# Patient Record
Sex: FEMALE | Race: WHITE | NOT HISPANIC OR LATINO
[De-identification: names, ages, dates, MRNs, and addresses within clinical notes are randomized per-mention and may not be internally consistent; named-entity substitution may affect disease eponyms.]

---

## 2021-05-10 ENCOUNTER — APPOINTMENT (OUTPATIENT)
Dept: CARE COORDINATION | Facility: HOME HEALTH | Age: 81
End: 2021-05-10
Payer: MEDICARE

## 2021-05-10 VITALS
HEART RATE: 92 BPM | DIASTOLIC BLOOD PRESSURE: 80 MMHG | SYSTOLIC BLOOD PRESSURE: 104 MMHG | RESPIRATION RATE: 16 BRPM | OXYGEN SATURATION: 96 %

## 2021-05-10 PROBLEM — Z00.00 ENCOUNTER FOR PREVENTIVE HEALTH EXAMINATION: Status: ACTIVE | Noted: 2021-05-10

## 2021-05-10 PROCEDURE — 99342 HOME/RES VST NEW LOW MDM 30: CPT

## 2021-05-10 NOTE — CURRENT MEDS
[Side Effects] :  side effects [Yes] : Reviewed medication list for presence of high-risk medications. [Blood Thinners] : blood thinners [Takes medication as prescribed] : does not take

## 2021-05-10 NOTE — HISTORY OF PRESENT ILLNESS
[FreeTextEntry1] : Follow-up for discharge from [facility] for New a.fib and NSTEMI\par  [de-identified] : Patient is a 80 year y/o female enrolled in the [employer] program with a history of crohn's, Gout, HTN, bowel resection in her 20s, who was recently admitted from 5/6/21-5/7/21 to [facility] of new a.fib, and NSTEMI.  Patient presented to [facility] ED for evaluation of variable heart rate.  She recently injured her right lower extremity/anterior shin, was placed on cephalexin but took 8 pills of it, developed diarrhea so stopped taking it.  She took tylenol about 4 days ago, had a self-resolving episode of "variable heart rate."  She had another episode on 5/5/21 at 7:30 PM after taking tylenol, felt a fluttering sensation in the sternum accompanied with warmth/heat in her upper chest.  Due to persistence of her symptoms she called EMS.  Patient noted to aLupisfib with RVR, and elevated trop, dx with NSTEMI.  Cardiac Cath done, no stents needed.  Patient converted to NSR with cardizem.  Patient given eliquis for anticoagulation.  Echo noted.  Patient medically optimized and stable for discharge.  \par \par Patient visited in home and is alert and oriented x3, and in no acute distress.  Patient states she does not feel right and thinks she is have a reaction to the medications.  She did not take the eliquis and cardizem today because of how she is feeling.  Patient has a hard time describing how she feels, but states she feels like she is in a daze.  Patient also states having episodes yesterday of feeling flushed, no episodes today.  Patient noted to have low blood pressure today. Noted that patient was getting cardizem xr 120mg in hospital and was discharged on 240mg.   Spoke to Dr. Velázquez, and determined that the cardizem dose might be too much.  Patient to hold cardizem and has appointment tomorrow with Dr. Velázquez who will determine home regiment.   Patient instructed to drink extra fluids and rest today.  Patient also with diarrhea yesterday which has now resolved.  Patient denies shortness of breath, palpitations, chest pain, nausea, or vomiting.\par

## 2021-05-10 NOTE — REVIEW OF SYSTEMS
[Diarrhea] : diarrhea [Negative] : Integumentary [Fever] : no fever [Fatigue] : no fatigue [Hot Flashes] : no hot flashes [Chest Pain] : no chest pain [Palpitations] : no palpitations [Lower Ext Edema] : no lower extremity edema [Shortness Of Breath] : no shortness of breath [Wheezing] : no wheezing [Cough] : no cough [Dyspnea on Exertion] : no dyspnea on exertion [Abdominal Pain] : no abdominal pain [Nausea] : no nausea [Vomiting] : no vomiting [de-identified] : "I feel dazed"

## 2021-05-10 NOTE — PLAN
[FreeTextEntry1] : 1) NSTEMI\par - ECHO:\par   LM: Normal\par   LAD: Tortuous vessels, mild luminal irregularities\par   LCx: Mild luminal irregularities \par   RCA: Large, dominant vessel, mild luminal irregularities\par   LVEF: 55-60%, LVH\par   EDP normal\par   Aortic root is dilated\par - Educated on taking Eliquis every 12 hours, at the same time every day, and to not miss a does\par - Hold Cardizem and follow-up with Dr. Velázquez tomorrow as scheduled.\par - Continue on anticoagulation regimen as directed.\par - Patient verbalized understanding and was able to teach back medication management.\par - Report all chest pain or discomfort.\par - Educated on blood pressure monitoring.\par - Contact information given and advised to call with any questions or concerns.\par \par 2) A.fib\par - Patient in NSR at time of exam\par - Continue eliquis q12 hours, teaching as above\par - Follow-up with Dr. Velázquez, mat need holter monitor as outpatient.\par \par 3) Hypertension\par - Hold cardizem at this time as b/p is low.\par \par \par

## 2021-05-10 NOTE — ASSESSMENT
[FreeTextEntry1] : Patient is a 80 year y/o female enrolled in the [employer] program with a history of crohn's, Gout, HTN, bowel resection in her 20s, who was recently admitted from 5/6/21-5/7/21 to [facility] of new a.fib, and NSTEMI.  Patient presented to [facility] ED for evaluation of variable heart rate.  She recently injured her right lower extremity/anterior shin, was placed on cephalexin but took 8 pills of it, developed diarrhea so stopped taking it.  She took tylenol about 4 days ago, had a self-resolving episode of "variable heart rate."  She had another episode on 5/5/21 at 7:30 PM after taking tylenol, felt a fluttering sensation in the sternum accompanied with warmth/heat in her upper chest.  Due to persistence of her symptoms she called EMS.  Patient noted to aLuipsfib with RVR, and elevated trop, dx with NSTEMI.  Cardiac Cath done, no stents needed.  Patient converted to NSR with cardizem.  Patient given eliquis for anticoagulation.  Echo noted.  Patient medically optimized and stable for discharge.    Patient visited in home and is alert and oriented x3, and in no acute distress.  Patient states she does not feel right and thinks she is have a reaction to the medications.  She did not take the eliquis and cardizem today because of how she is feeling.  Patient has a hard time describing how she feels, but states she feels like she is in a daze.  Patient also states having episodes yesterday of feeling flushed, no episodes today.  Patient noted to have low blood pressure today. Noted that patient was getting cardizem xr 120mg in hospital and was discharged on 240mg.   Spoke to Dr. Velázquez, and determined that the cardizem dose might be too much.  Patient to hold cardizem and has appointment tomorrow with Dr. Velázquez who will determine home regiment.   Patient instructed to drink extra fluids and rest today.  Patient also with diarrhea yesterday which has now resolved.  Patient denies shortness of breath, palpitations, chest pain, nausea, or vomiting.

## 2021-05-10 NOTE — DATA REVIEWED
[FreeTextEntry1] : ECHO IMPRESSION:\par \par   Aorta:             The aortic root is normal in size. The ascending aorta is\par mildly dilated.\par   \par             Aortic valve: Mildly calcified trileaflet aortic valve. The peak\par aortic valve gradient is 14 mmHg. The mean aortic valve gradient is 9 mmHg. No\par significant Aortic stenosis. Mild Aortic regurgitation.                 \par   \par             Left Atrium:    Normal  \par   \par             Mitral Valve:  Normal appearing mitral valve. No mitral stenosis.\par Mild Mitral regurgitation. Systolic anterior motion of the mitral valve cord.\par No LVEF outflow tract gradient identified at rest. The patient was unable to\par perform Valsalva.       \par   \par             Left Ventricle: Normal LV size. Basal septal hypertrophy, 1.9 cm.\par Normal LV systolic function without regional wall motion abnormalities. The\par estimated ejection fraction is 55 percent. LV diastolic function: Grade 1 LV\par diastolic dysfunction\par   \par             Right Ventricle: Normal size and function.\par   \par             Right Atrium:     Normal  \par   \par            Tricuspid Valve: Normal appearing tricuspid valve. Mild Tricuspid\par regurgitation. The estimated pulmonary artery systolic pressure is 24  mmHg. \par No pulmonary hypertension\par              \par             \par             Pulmonic Valve: Structurally normal. Trace pulmonic regurgitation \par              \par             Pericardium: No pericardial effusion. No echo evidence of cardiac\par tamponade \par              \par             IVC: The IVC is normal in size with >50% collapse with\par inspiration. This correlates with a normal RA pressure.\par              \par            Thrombus/Mass: No evidence of thrombus,mass or vegetation.\par              \par            Shunt:  No evidence of an intracardiac shunt.

## 2021-05-10 NOTE — PHYSICAL EXAM
[No Acute Distress] : no acute distress [Well Nourished] : well nourished [Well Developed] : well developed [Well-Appearing] : well-appearing [Normal Sclera/Conjunctiva] : normal sclera/conjunctiva [PERRL] : pupils equal round and reactive to light [Normal Outer Ear/Nose] : the outer ears and nose were normal in appearance [No JVD] : no jugular venous distention [No Respiratory Distress] : no respiratory distress  [No Accessory Muscle Use] : no accessory muscle use [Clear to Auscultation] : lungs were clear to auscultation bilaterally [Normal Rate] : normal rate  [Regular Rhythm] : with a regular rhythm [Normal S1, S2] : normal S1 and S2 [No Edema] : there was no peripheral edema [Soft] : abdomen soft [Non Tender] : non-tender [Non-distended] : non-distended [Normal Bowel Sounds] : normal bowel sounds [No Rash] : no rash [Coordination Grossly Intact] : coordination grossly intact [No Focal Deficits] : no focal deficits [Normal Gait] : normal gait [Normal Affect] : the affect was normal [Alert and Oriented x3] : oriented to person, place, and time [Normal Insight/Judgement] : insight and judgment were intact [de-identified] : Murmur noted

## 2021-05-11 ENCOUNTER — APPOINTMENT (OUTPATIENT)
Dept: HEART AND VASCULAR | Facility: CLINIC | Age: 81
End: 2021-05-11

## 2021-05-14 ENCOUNTER — APPOINTMENT (OUTPATIENT)
Dept: CARE COORDINATION | Facility: HOME HEALTH | Age: 81
End: 2021-05-14
Payer: MEDICARE

## 2021-05-14 VITALS
HEART RATE: 80 BPM | RESPIRATION RATE: 16 BRPM | DIASTOLIC BLOOD PRESSURE: 80 MMHG | OXYGEN SATURATION: 99 % | SYSTOLIC BLOOD PRESSURE: 120 MMHG

## 2021-05-14 DIAGNOSIS — I25.2 OLD MYOCARDIAL INFARCTION: ICD-10-CM

## 2021-05-14 DIAGNOSIS — R19.5 OTHER FECAL ABNORMALITIES: ICD-10-CM

## 2021-05-14 DIAGNOSIS — I10 ESSENTIAL (PRIMARY) HYPERTENSION: ICD-10-CM

## 2021-05-14 PROCEDURE — 99349 HOME/RES VST EST MOD MDM 40: CPT

## 2021-05-18 ENCOUNTER — NON-APPOINTMENT (OUTPATIENT)
Age: 81
End: 2021-05-18

## 2021-05-18 ENCOUNTER — APPOINTMENT (OUTPATIENT)
Dept: HEART AND VASCULAR | Facility: CLINIC | Age: 81
End: 2021-05-18
Payer: MEDICARE

## 2021-05-18 VITALS
DIASTOLIC BLOOD PRESSURE: 70 MMHG | WEIGHT: 108 LBS | TEMPERATURE: 98.5 F | SYSTOLIC BLOOD PRESSURE: 130 MMHG | OXYGEN SATURATION: 98 % | HEART RATE: 73 BPM

## 2021-05-18 PROCEDURE — 93228 REMOTE 30 DAY ECG REV/REPORT: CPT

## 2021-05-18 PROCEDURE — 36415 COLL VENOUS BLD VENIPUNCTURE: CPT

## 2021-05-18 PROCEDURE — 99214 OFFICE O/P EST MOD 30 MIN: CPT

## 2021-05-18 PROCEDURE — 99204 OFFICE O/P NEW MOD 45 MIN: CPT

## 2021-05-18 RX ORDER — DILTIAZEM HYDROCHLORIDE 240 MG/1
240 CAPSULE, COATED, EXTENDED RELEASE ORAL
Refills: 0 | Status: DISCONTINUED | COMMUNITY
End: 2021-05-18

## 2021-05-18 NOTE — CARDIOLOGY SUMMARY
[de-identified] : 5/11/21\par Normal LV size. Basal septal hypertrophy, 2.0 cm.\par Apical, apical septal, anteroseptal hypokinesis The estimated ejection\par fraction is 45 percent. LV diastolic function: Grade 1 LV diastolic\par dysfunction

## 2021-05-18 NOTE — DISCUSSION/SUMMARY
[___ Week(s)] : in [unfilled] week(s) [FreeTextEntry1] : 80 y/o F with recurrent Takotsubo CM, Afib w/ RVR in setting hypokalemia, CCB toxicity here for follow up\par \par # Stress CM\par - Continue low dose BB (patient not tolerant to medication)\par - Will consider starting low dose ACE\par - Repeat Echo in 2-3 weeks\par \par # AFib w/ RVR\par - Check BMP today\par - Event monitor for 30 days\par - Contiue Eliquis\par - Follow up with EP\par \par # Enlarged Aorta\par - Repeat CT in 6 months

## 2021-05-18 NOTE — PHYSICAL EXAM
[Well Developed] : well developed [Well Nourished] : well nourished [No Acute Distress] : no acute distress [Normal Conjunctiva] : normal conjunctiva [Normal Venous Pressure] : normal venous pressure [No Carotid Bruit] : no carotid bruit [Normal S1, S2] : normal S1, S2 [No Gallop] : no gallop [Murmur] : murmur [Clear Lung Fields] : clear lung fields [Good Air Entry] : good air entry [No Respiratory Distress] : no respiratory distress  [Soft] : abdomen soft [Non Tender] : non-tender [No Masses/organomegaly] : no masses/organomegaly [Normal Bowel Sounds] : normal bowel sounds [Normal Gait] : normal gait [No Edema] : no edema [No Cyanosis] : no cyanosis [No Clubbing] : no clubbing [No Varicosities] : no varicosities [No Rash] : no rash [No Skin Lesions] : no skin lesions [Moves all extremities] : moves all extremities [No Focal Deficits] : no focal deficits [Normal Speech] : normal speech [Alert and Oriented] : alert and oriented [Normal memory] : normal memory [de-identified] : systolic murmur, likely LVOT

## 2021-05-18 NOTE — HISTORY OF PRESENT ILLNESS
[FreeTextEntry1] : 79 y/o F with HTN, Prior Hx of Stress CM, recent admission at [facility] with new onset tachyarrhythmia and elevated troponin, Cardiac cath was negative for Coronary stenosis and she had a normal EF.\par \par Patient was discharged on CCB which was uptitrated on discharge, readmitted with hypotension, bradycardia and new EKG changes with prolonged QTc\par Repeat Echo shows anterior WMA with + LVOT gradient. Meds were discontinued

## 2021-05-19 PROBLEM — R19.5 LOOSE STOOLS: Status: ACTIVE | Noted: 2021-05-19

## 2021-05-19 LAB
ANION GAP SERPL CALC-SCNC: 14 MMOL/L
BUN SERPL-MCNC: 18 MG/DL
CALCIUM SERPL-MCNC: 9.9 MG/DL
CHLORIDE SERPL-SCNC: 104 MMOL/L
CO2 SERPL-SCNC: 24 MMOL/L
CREAT SERPL-MCNC: 1.42 MG/DL
GLUCOSE SERPL-MCNC: 76 MG/DL
MAGNESIUM SERPL-MCNC: 1.7 MG/DL
POTASSIUM SERPL-SCNC: 4.8 MMOL/L
SODIUM SERPL-SCNC: 142 MMOL/L

## 2021-05-19 RX ORDER — MESALAMINE 400 MG/1
400 CAPSULE, DELAYED RELEASE ORAL
Refills: 0 | Status: COMPLETED | COMMUNITY
End: 2021-05-19

## 2021-05-19 RX ORDER — CYCLOBENZAPRINE HCL 5 MG
5 TABLET ORAL
Refills: 0 | Status: COMPLETED | COMMUNITY
End: 2021-05-19

## 2021-05-19 NOTE — REVIEW OF SYSTEMS
[Fever] : no fever [Chills] : no chills [Fatigue] : fatigue [Chest Pain] : no chest pain [Palpitations] : no palpitations [Lower Ext Edema] : no lower extremity edema [Shortness Of Breath] : no shortness of breath [Cough] : no cough [Dyspnea on Exertion] : no dyspnea on exertion [Abdominal Pain] : no abdominal pain [Nausea] : no nausea [Constipation] : no constipation [Vomiting] : no vomiting [Negative] : Neurological [FreeTextEntry7] : Loose stool

## 2021-05-19 NOTE — COUNSELING
[de-identified] : Patient counseled on foods to eat to help with loose stools, including bread, bananas, yogurt, rice, applesauce.

## 2021-05-19 NOTE — PLAN
[FreeTextEntry1] : 1) Cardiomyopathy\par - Cardizem stopped and metoprolol 12.5mg started\par - Continue apixaban\par - Patient verbalized understanding and was able to teach back medication management.\par - Report all chest pain or discomfort.\par - Contact information given and advised to call with any questions or concerns.\par \par 2) A.fib\par - Patient in NSR at time of exam\par - Continue eliquis and metoprolol\par \par 3) HTN\par - Continue metoprolol\par \par 4) Loose stools\par - Probably related to recent antibiotic use\par - Advised to eat bananas, bread, rice, etc\par - Continue to monitor for worsening diarrhea.\par \par 5) Gout\par - Continue Febuxostat\par \par Follow-up with Dr. Velázquez on Tuesday as scheduled.\par

## 2021-05-19 NOTE — ASSESSMENT
[FreeTextEntry1] : Patient is a 81 year y/o female enrolled in the [employer] program with a history of crohns, gout, newly diagnosed a.fib, NSTEMI s/p noraml cath.  Patient recently readmitted from 5/10/21-5/13/21 to [facility] for chest discomfort.  Patient with EKG changes and increasing trop.  Patient diagnosed with Takotsubo cardiomyopathy.  Cardizem stopped and started on metoprolol.  Patient medically optimized and stable for discharge.\par \par Patient evaluated in her home and is alert and oriented x3, and in no acute distress.  Patient complains that she is having loose stools, about 1 per day, and feel sleepy.  Patient states the loose stool started when she was on abx a few weeks ago, has improved slightly but is still loose.  Patient educated on foods to help, such as bananas, bread, rice, etc.  Patient states she is very nervous that something else is going to happen, but reassured her that we will follow her closely with her doctors. Patient denies chest pain, palpitations, shortness of breath, abdominal pain, nausea, vomiting, diarrhea, lightheaded or dizziness.

## 2021-05-19 NOTE — PHYSICAL EXAM
[No Acute Distress] : no acute distress [Well Nourished] : well nourished [Well Developed] : well developed [Well-Appearing] : well-appearing [Normal Sclera/Conjunctiva] : normal sclera/conjunctiva [PERRL] : pupils equal round and reactive to light [Normal Outer Ear/Nose] : the outer ears and nose were normal in appearance [No JVD] : no jugular venous distention [No Respiratory Distress] : no respiratory distress  [No Accessory Muscle Use] : no accessory muscle use [Clear to Auscultation] : lungs were clear to auscultation bilaterally [Normal Rate] : normal rate  [Regular Rhythm] : with a regular rhythm [Normal S1, S2] : normal S1 and S2 [No Edema] : there was no peripheral edema [Soft] : abdomen soft [Non Tender] : non-tender [Non-distended] : non-distended [Normal Bowel Sounds] : normal bowel sounds [No CVA Tenderness] : no CVA  tenderness [No Rash] : no rash [No Focal Deficits] : no focal deficits [Normal Gait] : normal gait [Normal Affect] : the affect was normal [Alert and Oriented x3] : oriented to person, place, and time [Normal Mood] : the mood was normal [Normal Insight/Judgement] : insight and judgment were intact

## 2021-06-01 ENCOUNTER — APPOINTMENT (OUTPATIENT)
Dept: HEART AND VASCULAR | Facility: CLINIC | Age: 81
End: 2021-06-01
Payer: MEDICARE

## 2021-06-01 VITALS
HEIGHT: 57 IN | DIASTOLIC BLOOD PRESSURE: 60 MMHG | BODY MASS INDEX: 23.3 KG/M2 | SYSTOLIC BLOOD PRESSURE: 110 MMHG | HEART RATE: 67 BPM | WEIGHT: 108 LBS | OXYGEN SATURATION: 98 %

## 2021-06-01 PROCEDURE — 99214 OFFICE O/P EST MOD 30 MIN: CPT

## 2021-06-01 PROCEDURE — 36415 COLL VENOUS BLD VENIPUNCTURE: CPT

## 2021-06-02 LAB
ANION GAP SERPL CALC-SCNC: 14 MMOL/L
BASOPHILS # BLD AUTO: 0.06 K/UL
BASOPHILS NFR BLD AUTO: 1.1 %
BUN SERPL-MCNC: 22 MG/DL
CALCIUM SERPL-MCNC: 10.3 MG/DL
CHLORIDE SERPL-SCNC: 109 MMOL/L
CO2 SERPL-SCNC: 21 MMOL/L
CREAT SERPL-MCNC: 1.53 MG/DL
EOSINOPHIL # BLD AUTO: 0.17 K/UL
EOSINOPHIL NFR BLD AUTO: 3.2 %
GLUCOSE SERPL-MCNC: 87 MG/DL
HCT VFR BLD CALC: 42.6 %
HGB BLD-MCNC: 13 G/DL
IMM GRANULOCYTES NFR BLD AUTO: 0.6 %
LYMPHOCYTES # BLD AUTO: 1.5 K/UL
LYMPHOCYTES NFR BLD AUTO: 28.6 %
MAGNESIUM SERPL-MCNC: 1.7 MG/DL
MAN DIFF?: NORMAL
MCHC RBC-ENTMCNC: 30.5 GM/DL
MCHC RBC-ENTMCNC: 31.4 PG
MCV RBC AUTO: 102.9 FL
MONOCYTES # BLD AUTO: 0.37 K/UL
MONOCYTES NFR BLD AUTO: 7 %
NEUTROPHILS # BLD AUTO: 3.12 K/UL
NEUTROPHILS NFR BLD AUTO: 59.5 %
PLATELET # BLD AUTO: 247 K/UL
POTASSIUM SERPL-SCNC: 4.3 MMOL/L
RBC # BLD: 4.14 M/UL
RBC # FLD: 14 %
SODIUM SERPL-SCNC: 144 MMOL/L
WBC # FLD AUTO: 5.25 K/UL

## 2021-06-10 ENCOUNTER — NON-APPOINTMENT (OUTPATIENT)
Age: 81
End: 2021-06-10

## 2021-06-15 ENCOUNTER — NON-APPOINTMENT (OUTPATIENT)
Age: 81
End: 2021-06-15

## 2021-06-15 ENCOUNTER — APPOINTMENT (OUTPATIENT)
Dept: HEART AND VASCULAR | Facility: CLINIC | Age: 81
End: 2021-06-15
Payer: MEDICARE

## 2021-06-15 ENCOUNTER — APPOINTMENT (OUTPATIENT)
Dept: HEART AND VASCULAR | Facility: CLINIC | Age: 81
End: 2021-06-15

## 2021-06-15 VITALS
HEART RATE: 48 BPM | BODY MASS INDEX: 23.3 KG/M2 | TEMPERATURE: 98 F | SYSTOLIC BLOOD PRESSURE: 135 MMHG | DIASTOLIC BLOOD PRESSURE: 70 MMHG | OXYGEN SATURATION: 98 % | WEIGHT: 108 LBS | HEIGHT: 57 IN

## 2021-06-15 PROCEDURE — 93000 ELECTROCARDIOGRAM COMPLETE: CPT

## 2021-06-15 PROCEDURE — 99213 OFFICE O/P EST LOW 20 MIN: CPT

## 2021-06-16 NOTE — HISTORY OF PRESENT ILLNESS
[FreeTextEntry1] : 80 y/o F with HTN, Prior Hx of Stress CM, recent admission at [facility] with new onset tachyarrhythmia and elevated troponin, Cardiac cath was negative for Coronary stenosis and she had a normal EF.\par \par Patient was discharged on CCB which was uptitrated on discharge, readmitted with hypotension, bradycardia and new EKG changes with prolonged QTc\par Repeat Echo shows anterior WMA with + LVOT gradient. Meds were discontinued\par \par Patient here for follow up, has been feeling overall weak but no chest pain, SOB, syncope / presyncope. \par Her [employer] monitor did not show evidence of Afib and Eliquis was stopped\par Feels her heart beats slowly when she checks her pulse

## 2021-06-16 NOTE — REVIEW OF SYSTEMS
[Feeling Fatigued] : feeling fatigued [SOB] : no shortness of breath [Dyspnea on exertion] : not dyspnea during exertion [Chest Discomfort] : no chest discomfort [Lower Ext Edema] : no extremity edema [Leg Claudication] : no intermittent leg claudication [Palpitations] : no palpitations [Orthopnea] : no orthopnea [PND] : no PND [Syncope] : no syncope [Negative] : Heme/Lymph

## 2021-06-16 NOTE — DISCUSSION/SUMMARY
[___ Week(s)] : in [unfilled] week(s) [FreeTextEntry1] : 80 y/o F with recurrent Takotsubo CM, Afib w/ RVR in setting hypokalemia, CCB toxicity here for follow up\par \par # Stress CM\par - Hold BB now, she's having significant bradycardia\par - Will consider starting low dose ACE however worried about patients tolerance to any medication, she is very sensitive to any medication started\par - Repeat Echo next visit\par \par # AFib w/ RVR\par - Event monitor does not show any further episodes of Afib\par - On eliquis - update patient called regarding reaction she believes is related to eliquis and was discontinued due to absence of Afib on event monitor, will hold for now\par - Follow up with EP, some sinus pauses noted on monitor and sinus bradycardia today\par \par # Enlarged Aorta\par Repeat CT

## 2021-06-16 NOTE — PHYSICAL EXAM
[Well Developed] : well developed [Well Nourished] : well nourished [No Acute Distress] : no acute distress [Normal Conjunctiva] : normal conjunctiva [Normal Venous Pressure] : normal venous pressure [No Carotid Bruit] : no carotid bruit [Normal S1, S2] : normal S1, S2 [No Gallop] : no gallop [Murmur] : murmur [Clear Lung Fields] : clear lung fields [Good Air Entry] : good air entry [No Respiratory Distress] : no respiratory distress  [Soft] : abdomen soft [Non Tender] : non-tender [No Masses/organomegaly] : no masses/organomegaly [Normal Bowel Sounds] : normal bowel sounds [Normal Gait] : normal gait [No Edema] : no edema [No Cyanosis] : no cyanosis [No Clubbing] : no clubbing [No Varicosities] : no varicosities [No Rash] : no rash [No Skin Lesions] : no skin lesions [Moves all extremities] : moves all extremities [No Focal Deficits] : no focal deficits [Normal Speech] : normal speech [Alert and Oriented] : alert and oriented [Normal memory] : normal memory [de-identified] : systolic murmur, likely LVOT

## 2021-06-29 ENCOUNTER — APPOINTMENT (OUTPATIENT)
Dept: HEART AND VASCULAR | Facility: CLINIC | Age: 81
End: 2021-06-29
Payer: MEDICARE

## 2021-06-29 ENCOUNTER — NON-APPOINTMENT (OUTPATIENT)
Age: 81
End: 2021-06-29

## 2021-06-29 VITALS
BODY MASS INDEX: 22.87 KG/M2 | TEMPERATURE: 98 F | OXYGEN SATURATION: 98 % | SYSTOLIC BLOOD PRESSURE: 140 MMHG | WEIGHT: 106 LBS | DIASTOLIC BLOOD PRESSURE: 80 MMHG | HEART RATE: 84 BPM | HEIGHT: 57 IN

## 2021-06-29 PROCEDURE — 93000 ELECTROCARDIOGRAM COMPLETE: CPT

## 2021-06-29 PROCEDURE — 99215 OFFICE O/P EST HI 40 MIN: CPT

## 2021-06-29 RX ORDER — APIXABAN 2.5 MG/1
2.5 TABLET, FILM COATED ORAL
Refills: 0 | Status: DISCONTINUED | COMMUNITY
End: 2021-06-29

## 2021-07-01 ENCOUNTER — NON-APPOINTMENT (OUTPATIENT)
Age: 81
End: 2021-07-01

## 2021-07-01 NOTE — HISTORY OF PRESENT ILLNESS
[FreeTextEntry1] : 80 y/o F with HTN, Prior Hx of Stress CM, recent admission at [facility] with new onset tachyarrhythmia and elevated troponin, Cardiac cath was negative for Coronary stenosis and she had a normal EF.\par \par Patient was discharged on CCB which was uptitrated on discharge, readmitted with hypotension, bradycardia and new EKG changes with prolonged QTc\par Repeat Echo showed anterior WMA with + LVOT gradient. Meds were discontinued\par \par On outpatient follow up patient was feeling tired, HR was slow on BB which was discontinued\par \par Patient here for follow up today, reports feeling better, denies chest pain, SOB. Energy level improved somewhat. Has occasional dizziness, denies syncope\par \par EKG 6/15/21: Sinus bradycardia HR 40, BB held\par EKG 6/29/21: Sinus rhythm, HR 84

## 2021-07-01 NOTE — DISCUSSION/SUMMARY
[___ Month(s)] : in [unfilled] month(s) [FreeTextEntry1] : 82 y/o F with recurrent Takotsubo CM, transiert Afib w/ RVR in setting hypokalemia, CCB toxicity here for follow up\par \par # Stress CM\par - Holding BB due to significant bradycardia / sinus pauses, EP consult\par - Will consider starting low dose ACE however worried about patients tolerance to any medication, she is very sensitive to any medication started\par - Echocardiogram ordered for in office today - (EF appears normal and echo showing recovery from her Stress induced CM)\par \par # AFib w/ RVR\par - Event monitor does not show any further episodes of Afib\par - Was on eliquis - patient called regarding reaction she believes is related to eliquis and was discontinued due to absence of Afib on event monitor, will hold for now\par - Follow up with EP, some sinus pauses noted on monitor\par \par # HTN\par Her PCP has started her on Amlodipine 2.5mg daily\par BPs at goal today\par \par # Enlarged Aorta\par Repeat CT in 6 months - 1 year

## 2021-07-01 NOTE — REVIEW OF SYSTEMS
[SOB] : no shortness of breath [Dyspnea on exertion] : not dyspnea during exertion [Chest Discomfort] : no chest discomfort [Lower Ext Edema] : no extremity edema [Leg Claudication] : no intermittent leg claudication [Palpitations] : no palpitations [Orthopnea] : no orthopnea [PND] : no PND [Syncope] : no syncope [Dizziness] : dizziness [Negative] : Heme/Lymph

## 2021-07-06 ENCOUNTER — APPOINTMENT (OUTPATIENT)
Dept: HEART AND VASCULAR | Facility: CLINIC | Age: 81
End: 2021-07-06

## 2021-07-21 NOTE — HISTORY OF PRESENT ILLNESS
[FreeTextEntry1] : 80 y/o F with HTN, Prior Hx of Stress CM, recent admission at [facility] with new onset tachyarrhythmia and elevated troponin, Cardiac cath was negative for Coronary stenosis and she had a normal EF.\par \par Patient was discharged on CCB which was uptitrated on discharge, readmitted with hypotension, bradycardia and new EKG changes with prolonged QTc\par Repeat Echo shows anterior WMA with + LVOT gradient. Meds were discontinued\par \par Patient here for follow up, states she has been doing well overall, denies any palpitations, lightheadedness, syncope, has been wearing heart monitor.

## 2021-07-21 NOTE — PHYSICAL EXAM
[Well Developed] : well developed [Well Nourished] : well nourished [No Acute Distress] : no acute distress [Normal Conjunctiva] : normal conjunctiva [Normal Venous Pressure] : normal venous pressure [No Carotid Bruit] : no carotid bruit [Normal S1, S2] : normal S1, S2 [No Gallop] : no gallop [Murmur] : murmur [Clear Lung Fields] : clear lung fields [Good Air Entry] : good air entry [No Respiratory Distress] : no respiratory distress  [Soft] : abdomen soft [Non Tender] : non-tender [No Masses/organomegaly] : no masses/organomegaly [Normal Bowel Sounds] : normal bowel sounds [Normal Gait] : normal gait [No Edema] : no edema [No Cyanosis] : no cyanosis [No Clubbing] : no clubbing [No Varicosities] : no varicosities [No Rash] : no rash [No Skin Lesions] : no skin lesions [Moves all extremities] : moves all extremities [No Focal Deficits] : no focal deficits [Normal Speech] : normal speech [Alert and Oriented] : alert and oriented [Normal memory] : normal memory [de-identified] : systolic murmur, likely LVOT

## 2021-07-21 NOTE — DISCUSSION/SUMMARY
[___ Week(s)] : in [unfilled] week(s) [FreeTextEntry1] : 80 y/o F with recurrent Takotsubo CM, Afib w/ RVR in setting hypokalemia, CCB toxicity here for follow up\par \par # Stress CM\par - Continue low dose BB (patient not tolerant to medication)\par - Will consider starting low dose ACE\par - Repeat Echo next visit\par \par # AFib w/ RVR\par - Event monitor does not show any further episodes of Afib\par - On eliquis - update patient called regarding reaction she believes is related to eliquis and was discontinued due to absence of Afib on event monitor, will hold for now\par - Follow up with EP\par - Will check labs in office to makes sure K and other labs wnl post hospital - blood was collected in office\par \par # Enlarged Aorta\par - Repeat CT in 5-6 months. \par

## 2021-07-27 ENCOUNTER — APPOINTMENT (OUTPATIENT)
Dept: HEART AND VASCULAR | Facility: CLINIC | Age: 81
End: 2021-07-27
Payer: MEDICARE

## 2021-07-27 ENCOUNTER — NON-APPOINTMENT (OUTPATIENT)
Age: 81
End: 2021-07-27

## 2021-07-27 VITALS
HEART RATE: 87 BPM | DIASTOLIC BLOOD PRESSURE: 82 MMHG | BODY MASS INDEX: 63.21 KG/M2 | SYSTOLIC BLOOD PRESSURE: 124 MMHG | RESPIRATION RATE: 14 BRPM | HEIGHT: 57 IN | OXYGEN SATURATION: 96 % | WEIGHT: 293 LBS

## 2021-07-27 PROCEDURE — 99213 OFFICE O/P EST LOW 20 MIN: CPT

## 2021-07-27 PROCEDURE — 93000 ELECTROCARDIOGRAM COMPLETE: CPT

## 2021-07-29 ENCOUNTER — NON-APPOINTMENT (OUTPATIENT)
Age: 81
End: 2021-07-29

## 2021-07-29 NOTE — HISTORY OF PRESENT ILLNESS
[FreeTextEntry1] : 82 y/o F with HTN, Prior Hx of Stress CM, recent admission at [facility] with new onset tachyarrhythmia and elevated troponin, Cardiac cath was negative for Coronary stenosis and she had a normal EF.\par \par Patient was discharged on CCB which was uptitrated on discharge, readmitted with hypotension, bradycardia and new EKG changes with prolonged QTc\par Repeat Echo showed anterior WMA with + LVOT gradient. Meds were discontinued\par \par On outpatient follow up patient was feeling tired, HR was slow on BB which was discontinued\par \par Patient here for follow up today, reports feeling well, denies chest pain, SOB. Energy level improved somewhat. Has some MEYER. Has occasional dizziness, denies syncope\par \par EKG 6/15/21: Sinus bradycardia HR 40, BB held\par EKG 6/29/21: Sinus rhythm, HR 84\par Echo: 6/30/21: EF 65%, basal septal hypertrophy with chordal PREETHI, no WMA mild MR, mild TR, PASP 28mmHg, Aorta 3.9cm\par EKG 7/27/2021: NSR, HR 81

## 2021-07-29 NOTE — REVIEW OF SYSTEMS
[Dyspnea on exertion] : dyspnea during exertion [Negative] : Heme/Lymph [SOB] : no shortness of breath [Chest Discomfort] : no chest discomfort [Lower Ext Edema] : no extremity edema [Leg Claudication] : no intermittent leg claudication [Palpitations] : no palpitations [Orthopnea] : no orthopnea [PND] : no PND [Syncope] : no syncope

## 2021-07-29 NOTE — DISCUSSION/SUMMARY
[With ___] : with [unfilled] [FreeTextEntry1] : 82 y/o F with recurrent Takotsubo CM, transient Afib w/ RVR in setting hypokalemia, CCB toxicity here for follow up\par \par # Stress CM - resolved\par - Echocardiogram shows complete recovery of LVEF from Takotsubo CM\par - Holding BB due to significant bradycardia / sinus pauses, EP consulted\par - Will consider starting low dose ACE however worried about patients tolerance to any medication, she is very sensitive to any medication started\par \par # AFib w/ RVR\par - Event monitor does not show any further episodes of Afib\par - Was on eliquis - patient called regarding reaction she believes is related to eliquis and was discontinued due to absence of Afib on event monitor, will hold for now\par - Follow up with EP, some sinus pauses noted on monitor\par \par # HTN\par Her PCP has started her on Amlodipine 2.5mg daily\par BPs at goal today\par \par # Enlarged Aorta\par Repeat CT in 5 months - 1 year.

## 2021-08-03 ENCOUNTER — APPOINTMENT (OUTPATIENT)
Dept: HEART AND VASCULAR | Facility: CLINIC | Age: 81
End: 2021-08-03
Payer: MEDICARE

## 2021-08-03 VITALS
WEIGHT: 108 LBS | SYSTOLIC BLOOD PRESSURE: 135 MMHG | OXYGEN SATURATION: 95 % | HEART RATE: 77 BPM | HEIGHT: 57 IN | BODY MASS INDEX: 23.3 KG/M2 | DIASTOLIC BLOOD PRESSURE: 80 MMHG

## 2021-08-03 PROCEDURE — 99213 OFFICE O/P EST LOW 20 MIN: CPT

## 2021-10-05 ENCOUNTER — APPOINTMENT (OUTPATIENT)
Dept: HEART AND VASCULAR | Facility: CLINIC | Age: 81
End: 2021-10-05
Payer: MEDICARE

## 2021-10-05 ENCOUNTER — NON-APPOINTMENT (OUTPATIENT)
Age: 81
End: 2021-10-05

## 2021-10-05 VITALS
BODY MASS INDEX: 24.99 KG/M2 | SYSTOLIC BLOOD PRESSURE: 115 MMHG | DIASTOLIC BLOOD PRESSURE: 90 MMHG | HEART RATE: 91 BPM | WEIGHT: 108 LBS | OXYGEN SATURATION: 98 % | HEIGHT: 55 IN

## 2021-10-05 PROCEDURE — 99213 OFFICE O/P EST LOW 20 MIN: CPT

## 2021-10-06 NOTE — PHYSICAL EXAM
[Well Developed] : well developed [Well Nourished] : well nourished [Normal S1, S2] : normal S1, S2 [No Murmur] : no murmur [No Gallop] : no gallop [Clear Lung Fields] : clear lung fields [No Respiratory Distress] : no respiratory distress  [Soft] : abdomen soft [Non Tender] : non-tender [Normal Gait] : normal gait [No Edema] : no edema

## 2021-10-06 NOTE — REVIEW OF SYSTEMS
[Fever] : no fever [Chills] : no chills [SOB] : no shortness of breath [Dyspnea on exertion] : not dyspnea during exertion [Syncope] : no syncope [Cough] : no cough [Abdominal Pain] : no abdominal pain [Nausea] : no nausea [Vomiting] : no vomiting
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
## 2021-10-17 NOTE — REASON FOR VISIT
[Arrhythmia/ECG Abnorrmalities] : arrhythmia/ECG abnormalities [FreeTextEntry3] : Dr. Ralf Velázquez

## 2021-10-17 NOTE — HISTORY OF PRESENT ILLNESS
[FreeTextEntry1] : 80 yo female with hypertension, admission for stress cardiomyopathy with brief AF during that setting. Cardiac cath was negative for Coronary stenosis and she had a normal EF. She was discharged with higher dose of calcium channel blocker, readmitted with hypotension, bradycardia and new EKG changes with prolonged QTc\par Repeat Echo showed anterior WMA with + LVOT gradient. Meds were discontinued. She was switched to beta-blocker. This was discontinued due to fatigue. She has not had any more AF noted since that time.\par Event monitor in July 2021 showed sinus rhythm with brief episodes of atrial arrhythmia, with some sinus pauses up to 3 seconds. She has no complaints. She denies palpitations, dizziness, or syncope.

## 2021-10-19 ENCOUNTER — APPOINTMENT (OUTPATIENT)
Dept: HEART AND VASCULAR | Facility: CLINIC | Age: 81
End: 2021-10-19
Payer: MEDICARE

## 2021-10-19 VITALS
WEIGHT: 108 LBS | SYSTOLIC BLOOD PRESSURE: 170 MMHG | OXYGEN SATURATION: 95 % | HEIGHT: 55 IN | HEART RATE: 75 BPM | BODY MASS INDEX: 24.99 KG/M2 | DIASTOLIC BLOOD PRESSURE: 98 MMHG

## 2021-10-19 PROCEDURE — 99212 OFFICE O/P EST SF 10 MIN: CPT
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
## 2021-10-20 NOTE — HISTORY OF PRESENT ILLNESS
[FreeTextEntry1] : 82 y/o F with HTN, Prior Hx of Stress CM, recent admission at [facility] with new onset tachyarrhythmia and elevated troponin, Cardiac cath was negative for Coronary stenosis and she had a normal EF.\par \par Patient was discharged on CCB which was uptitrated on discharge, readmitted with hypotension, bradycardia and new EKG changes with prolonged QTc\par Repeat Echo showed anterior WMA with + LVOT gradient. Meds were discontinued\par \par On outpatient follow up patient was feeling tired, HR was slow on BB which was discontinued\par \par Patient here for follow up today, reports feeling well, denies chest pain, SOB. Energy level improved somewhat. Has some MEYER. Has occasional dizziness, denies syncope\par \par EKG 6/15/21: Sinus bradycardia HR 40, BB held\par EKG 6/29/21: Sinus rhythm, HR 84\par Echo: 6/30/21: EF 65%, basal septal hypertrophy with chordal PREETHI, no WMA mild MR, mild TR, PASP 28mmHg, Aorta 3.9cm\par EKG 7/27/2021: NSR, HR 81 \par EKG 10/5/2021: NSR, HR 75

## 2021-10-26 ENCOUNTER — APPOINTMENT (OUTPATIENT)
Dept: HEART AND VASCULAR | Facility: CLINIC | Age: 81
End: 2021-10-26

## 2021-12-07 ENCOUNTER — APPOINTMENT (OUTPATIENT)
Dept: HEART AND VASCULAR | Facility: CLINIC | Age: 81
End: 2021-12-07
Payer: MEDICARE

## 2021-12-07 VITALS
WEIGHT: 108 LBS | OXYGEN SATURATION: 97 % | DIASTOLIC BLOOD PRESSURE: 70 MMHG | HEART RATE: 94 BPM | HEIGHT: 61 IN | SYSTOLIC BLOOD PRESSURE: 110 MMHG | TEMPERATURE: 98 F | BODY MASS INDEX: 20.39 KG/M2

## 2021-12-07 DIAGNOSIS — I48.91 UNSPECIFIED ATRIAL FIBRILLATION: ICD-10-CM

## 2021-12-07 DIAGNOSIS — I42.9 CARDIOMYOPATHY, UNSPECIFIED: ICD-10-CM

## 2021-12-07 PROCEDURE — 99213 OFFICE O/P EST LOW 20 MIN: CPT

## 2021-12-21 ENCOUNTER — APPOINTMENT (OUTPATIENT)
Dept: HEART AND VASCULAR | Facility: CLINIC | Age: 81
End: 2021-12-21
Payer: MEDICARE

## 2021-12-21 VITALS
HEIGHT: 61 IN | TEMPERATURE: 98.7 F | DIASTOLIC BLOOD PRESSURE: 70 MMHG | WEIGHT: 109 LBS | SYSTOLIC BLOOD PRESSURE: 110 MMHG | BODY MASS INDEX: 20.58 KG/M2

## 2021-12-21 PROCEDURE — 99213 OFFICE O/P EST LOW 20 MIN: CPT

## 2021-12-27 NOTE — HISTORY OF PRESENT ILLNESS
[FreeTextEntry1] : 82 y/o F with HTN, Prior Hx of Stress CM, recent admission at [facility] with new onset tachyarrhythmia and elevated troponin, Cardiac cath was negative for Coronary stenosis and she had a normal EF.\par \par Patient was discharged on CCB which was uptitrated on discharge, readmitted with hypotension, bradycardia and new EKG changes with prolonged QTc\par Repeat Echo showed anterior WMA with + LVOT gradient. Meds were discontinued\par \par On outpatient follow up patient was feeling tired, HR was slow on BB which was discontinued\par \par Patient here for follow up today, reports feeling well, denies chest pain, SOB. Energy level improved somewhat. Has some MEYER. Has occasional dizziness, denies syncope\par \par EKG 6/15/21: Sinus bradycardia HR 40, BB held\par EKG 6/29/21: Sinus rhythm, HR 84\par Echo: 6/30/21: EF 65%, basal septal hypertrophy with chordal PREETHI, no WMA mild MR, mild TR, PASP 28mmHg, Aorta 3.9cm\par EKG 7/27/2021: NSR, HR 81 \par EKG 10/5/2021: NSR\par

## 2021-12-27 NOTE — DISCUSSION/SUMMARY
[FreeTextEntry1] : 82 y/o F with recurrent Takotsubo CM, Afib w/ RVR in setting hypokalemia, CCB toxicity here for follow up\par \par # Stress CM\par - Resolved\par \par # AFib w/ RVR\par - No recurrence of Afib, now off eliquis, BB, likely in setting of acute episode of CM / abnormal eletrolytes disorder\par \par # Enlarged Aorta\par - Repeat CT in 5-6 months. \par \par # HTN\par - Continue Amlodipine

## 2022-03-07 PROBLEM — I48.91 ATRIAL FIBRILLATION: Status: ACTIVE | Noted: 2021-05-10

## 2022-03-07 PROBLEM — I42.9 CARDIOMYOPATHY: Status: ACTIVE | Noted: 2021-05-14

## 2022-03-07 NOTE — REVIEW OF SYSTEMS
[Fever] : no fever [Chills] : no chills [SOB] : no shortness of breath [Dyspnea on exertion] : not dyspnea during exertion [Syncope] : no syncope [Cough] : no cough [Abdominal Pain] : no abdominal pain independent [Nausea] : no nausea [Vomiting] : no vomiting

## 2022-03-07 NOTE — HISTORY OF PRESENT ILLNESS
[FreeTextEntry1] : 82 yo female with hypertension, admission for stress cardiomyopathy with brief AF during that setting. Cardiac cath was negative for Coronary stenosis and she had a normal EF. She was discharged with higher dose of calcium channel blocker, readmitted with hypotension, bradycardia and new EKG changes with prolonged QTc\par Repeat Echo showed anterior WMA with + LVOT gradient. Meds were discontinued. She was switched to beta-blocker. This was discontinued due to fatigue. She has not had any more AF noted since that time.\par Event monitor in July 2021 showed sinus rhythm with brief episodes of atrial arrhythmia, with some sinus pauses up to 3 seconds.\par She has no cardiac complaints. She denies palpitations, dizziness, or syncope.

## 2022-07-05 ENCOUNTER — APPOINTMENT (OUTPATIENT)
Dept: HEART AND VASCULAR | Facility: CLINIC | Age: 82
End: 2022-07-05

## 2022-07-05 ENCOUNTER — NON-APPOINTMENT (OUTPATIENT)
Age: 82
End: 2022-07-05

## 2022-07-05 VITALS
BODY MASS INDEX: 21.13 KG/M2 | HEIGHT: 61 IN | DIASTOLIC BLOOD PRESSURE: 84 MMHG | OXYGEN SATURATION: 97 % | SYSTOLIC BLOOD PRESSURE: 147 MMHG | TEMPERATURE: 98.1 F | HEART RATE: 85 BPM | WEIGHT: 111.9 LBS

## 2022-07-05 PROCEDURE — 93000 ELECTROCARDIOGRAM COMPLETE: CPT

## 2022-07-05 PROCEDURE — 99213 OFFICE O/P EST LOW 20 MIN: CPT

## 2022-07-06 NOTE — HISTORY OF PRESENT ILLNESS
[FreeTextEntry1] : 81 y/o F with HTN, Prior Hx of Stress CM, recent admission at [facility] with new onset tachyarrhythmia and elevated troponin, Cardiac cath was negative for Coronary stenosis and she had a normal EF.\par \par Patient was discharged on CCB which was uptitrated on discharge, readmitted with hypotension, bradycardia and new EKG changes with prolonged QTc\par Repeat Echo showed anterior WMA with + LVOT gradient. Meds were discontinued\par \par On outpatient follow up patient was feeling tired, HR was slow on BB which was discontinued\par \par EKG 6/15/21: Sinus bradycardia HR 40, BB held\par EKG 6/29/21: Sinus rhythm, HR 84\par Echo: 6/30/21: EF 65%, basal septal hypertrophy with chordal PREETHI, no WMA mild MR, mild TR, PASP 28mmHg, Aorta 3.9cm\par EKG 7/27/2021: NSR, HR 81 \par EKG 10/5/2021: NSR\par EKG 7/5/2022: NSR, HR 80\par \par Patient here for follow up today, reports feeling well, denies chest pain, SOB

## 2022-07-06 NOTE — DISCUSSION/SUMMARY
[FreeTextEntry1] : 81 y/o F with recurrent Takotsubo CM, Afib w/ RVR in setting hypokalemia, CCB toxicity here for follow up\par \par # Stress CM\par - Resolved\par \par # AFib w/ RVR\par - No recurrence of Afib, now off eliquis, BB, likely in setting of acute episode of CM / abnormal eletrolytes disorder\par \par # Enlarged Aorta\par Monitoring\par \par # HTN\par - Continue Amlodipine. 1/2 dose BID [EKG obtained to assist in diagnosis and management of assessed problem(s)] : EKG obtained to assist in diagnosis and management of assessed problem(s)

## 2022-10-18 ENCOUNTER — APPOINTMENT (OUTPATIENT)
Dept: HEART AND VASCULAR | Facility: CLINIC | Age: 82
End: 2022-10-18

## 2022-10-18 ENCOUNTER — NON-APPOINTMENT (OUTPATIENT)
Age: 82
End: 2022-10-18

## 2022-10-18 VITALS
DIASTOLIC BLOOD PRESSURE: 90 MMHG | SYSTOLIC BLOOD PRESSURE: 126 MMHG | HEIGHT: 61 IN | HEART RATE: 95 BPM | TEMPERATURE: 98.7 F | BODY MASS INDEX: 20.01 KG/M2 | WEIGHT: 106 LBS | OXYGEN SATURATION: 94 %

## 2022-10-18 PROCEDURE — 99214 OFFICE O/P EST MOD 30 MIN: CPT

## 2022-10-18 PROCEDURE — 93000 ELECTROCARDIOGRAM COMPLETE: CPT

## 2022-10-18 RX ORDER — AMLODIPINE BESYLATE 5 MG/1
5 TABLET ORAL DAILY
Qty: 90 | Refills: 3 | Status: DISCONTINUED | COMMUNITY
Start: 2021-10-27 | End: 2022-10-18

## 2022-10-19 NOTE — DISCUSSION/SUMMARY
[FreeTextEntry1] : 83 y/o F with recurrent Takotsubo CM, Afib w/ RVR in setting hypokalemia, CCB toxicity, HTN here for follow up\par \par # Stress CM (Takotsubo)\par - Resolved, EF normal\par Patient did not tolerate any GDMT\par \par # AFib w/ RVR\par - No recurrence of Afib, now off eliquis, BB, likely in setting of acute episode of CM / abnormal eletrolytes disorder\par \par # Enlarged Aorta\par Monitoring\par \par # HTN\par - Continue Amlodipine. will change to 2.5mg once a day as patient having low BP readings and feeling lightheaded / lethargic - reports she has lost weight [EKG obtained to assist in diagnosis and management of assessed problem(s)] : EKG obtained to assist in diagnosis and management of assessed problem(s)

## 2022-10-19 NOTE — HISTORY OF PRESENT ILLNESS
[FreeTextEntry1] : 81 y/o F with recurrent Takotsubo CM, Afib w/ RVR in setting hypokalemia, CCB toxicity, HTN here for follow up\par \par Patient was discharged on CCB which was uptitrated on discharge, readmitted with hypotension, bradycardia and new EKG changes with prolonged QTc\par Repeat Echo showed anterior WMA with + LVOT gradient. Meds were discontinued\par \par On outpatient follow up patient was feeling tired, HR was slow on BB which was discontinued\par \par Cath 5/6/2021: Non-obstructive, normal EF\par EKG 6/15/21: Sinus bradycardia HR 40, BB held\par EKG 6/29/21: Sinus rhythm, HR 84\par Echo: 6/30/21: EF 65%, basal septal hypertrophy with chordal PREETHI, no WMA mild MR, mild TR, PASP 28mmHg, Aorta 3.9cm\par EKG 7/27/2021: NSR, HR 81 \par EKG 10/5/2021: NSR\par EKG 10/18/2022: NSR, HR 84\par \par \par Patient here for follow up today, reports feeling well, denies chest pain, SOB. Energy level improved somewhat. Has some MEYER. Denies syncope\par Main concern is her deteriorating vision

## 2022-11-09 ENCOUNTER — APPOINTMENT (OUTPATIENT)
Dept: BREAST CENTER | Facility: CLINIC | Age: 82
End: 2022-11-09

## 2022-11-09 ENCOUNTER — NON-APPOINTMENT (OUTPATIENT)
Age: 82
End: 2022-11-09

## 2022-11-09 VITALS
HEART RATE: 85 BPM | SYSTOLIC BLOOD PRESSURE: 152 MMHG | DIASTOLIC BLOOD PRESSURE: 92 MMHG | HEIGHT: 61 IN | WEIGHT: 106 LBS | BODY MASS INDEX: 20.01 KG/M2

## 2022-11-09 DIAGNOSIS — Z78.9 OTHER SPECIFIED HEALTH STATUS: ICD-10-CM

## 2022-11-09 DIAGNOSIS — Z87.442 PERSONAL HISTORY OF URINARY CALCULI: ICD-10-CM

## 2022-11-09 DIAGNOSIS — H54.7 UNSPECIFIED VISUAL LOSS: ICD-10-CM

## 2022-11-09 DIAGNOSIS — Z87.19 PERSONAL HISTORY OF OTHER DISEASES OF THE DIGESTIVE SYSTEM: ICD-10-CM

## 2022-11-09 DIAGNOSIS — Z86.69 PERSONAL HISTORY OF OTHER DISEASES OF THE NERVOUS SYSTEM AND SENSE ORGANS: ICD-10-CM

## 2022-11-09 PROCEDURE — 99205 OFFICE O/P NEW HI 60 MIN: CPT

## 2022-11-09 RX ORDER — FAMOTIDINE 20 MG/1
20 TABLET, FILM COATED ORAL
Qty: 180 | Refills: 0 | Status: ACTIVE | COMMUNITY
Start: 2022-03-23

## 2022-11-09 RX ORDER — FAMOTIDINE 40 MG/1
40 TABLET, FILM COATED ORAL DAILY
Refills: 0 | Status: DISCONTINUED | COMMUNITY
End: 2022-11-09

## 2022-11-10 ENCOUNTER — RESULT REVIEW (OUTPATIENT)
Age: 82
End: 2022-11-10

## 2022-11-11 PROBLEM — Z86.69 HISTORY OF MACULAR DEGENERATION: Status: RESOLVED | Noted: 2022-11-09 | Resolved: 2022-11-11

## 2022-11-11 PROBLEM — Z87.19 HISTORY OF CROHN'S DISEASE: Status: RESOLVED | Noted: 2021-05-10 | Resolved: 2022-11-11

## 2022-11-16 DIAGNOSIS — R92.8 OTHER ABNORMAL AND INCONCLUSIVE FINDINGS ON DIAGNOSTIC IMAGING OF BREAST: ICD-10-CM

## 2022-11-16 NOTE — REVIEW OF SYSTEMS
[Feeling Tired] : feeling tired [Recent Weight Loss (___ Lbs)] : recent [unfilled] ~Ulb weight loss [Eyesight Problems] : eyesight problems [Earache] : earache [Loss Of Hearing] : hearing loss [Chest Pain] : chest pain [Shortness Of Breath] : shortness of breath [Abdominal Pain] : abdominal pain [Diarrhea] : diarrhea [Heartburn] : heartburn [Hand Pain] : hand pain [Mid Back Pain] : mid back pain [Breast Pain] : breast pain [Breast Lump] : breast lump [Dizziness] : dizziness [Easy Bleeding] : a tendency for easy bleeding [Easy Bruising] : a tendency for easy bruising [Negative] : Heme/Lymph [FreeTextEntry2] : Joint pain

## 2022-11-16 NOTE — PHYSICAL EXAM
[Normocephalic] : normocephalic [Atraumatic] : atraumatic [Supple] : supple [No Supraclavicular Adenopathy] : no supraclavicular adenopathy [Examined in the supine and seated position] : examined in the supine and seated position [Symmetrical] : symmetrical [No dominant masses] : no dominant masses left breast [No Nipple Retraction] : no left nipple retraction [No Nipple Discharge] : no left nipple discharge [No Axillary Lymphadenopathy] : no left axillary lymphadenopathy [No Edema] : no edema [No Rashes] : no rashes [No Ulceration] : no ulceration [de-identified] : there is a 3cm mass UOQ which encompasses the majority of her outer quadrant [de-identified] : there is an enlarged node, soft

## 2022-11-16 NOTE — CONSULT LETTER
[Dear  ___] : Dear  [unfilled], [( Thank you for referring [unfilled] for consultation for _____ )] : Thank you for referring [unfilled] for consultation for [unfilled] [Please see my note below.] : Please see my note below. [Consult Closing:] : Thank you very much for allowing me to participate in the care of this patient.  If you have any questions, please do not hesitate to contact me. [Sincerely,] : Sincerely, [FreeTextEntry3] : Stephanie Austin MS DO\par Breast Surgeon\par [facility] \[place] [address]\par

## 2022-11-16 NOTE — ASSESSMENT
[FreeTextEntry1] : The pathology and imaging results were reviewed and discussed. She is a stage IA right breast cancer (T1cN0) HER2 negative, ER+/NH+ (AJCC 8thed).\par \par Breast MRI is recommended for further evaluation of the extent of disease/possible presence of multicentric and/or contralateral disease. \par \par The use of multimodality therapy for the treatment of breast cancer was discussed.  \par Surgical options were reviewed including a lumpectomy to negative margins, with whole breast radiation therapy versus a mastectomy with or without reconstruction. Included in this discussion with the results of the NSABP-04 and 06 trials.\par \par  Mastectomy techniques were discussed in detail including skin sparing and nipple sparing techniques. Recurrence was reviewed and that mastectomy does not confer a 100% risk reduction nor guarantee against breast cancer in the future.\par \par Reconstructive options were briefly reviewed, including implant based versus tissue flap based reconstruction options.  Referral  to a plastic surgeon was offered.  The patient was informed that breast reconstruction is covered by insurance per state and federal laws.   \par \par Axillary staging was discussed. We reviewed the sentinel lymph node procedure, and how if the sentinel lymph node is found to have metastatic disease either on the intraoperative evaluation or on the final pathology, that an axillary dissection of the remaining lymph nodes may be recommended. It was explained that an axillary dissection takes "level one and level two" lymph nodes, and also "level three" if they feel clinically suspicious. It was explained that if the sentinel lymph node was not able to be found, that an axillary dissection may be necessary.  I reviewed the risks of lymphedema for SLND (6%) versus ALND (20%). I reviewed our lymphedema monitoring program. I am not favoring SLNE given her age.\par \par The Z-0011 study was touched upon, outlining that there is evidence that it may not be necessary to complete an axillary dissection in select patients even if one or two sentinel nodes are positive for cancer, as long as the cancer in the nodes is confined to within the nodes, the nodes aren't  matted down, and if the cancer meets certain criteria including being less than 5 cm, treated by lumpectomy and conventional whole breast radiation, and the patient is planning to take recommended adjuvant therapy, and didn't require preoperative chemotherapy.   \par \par The general indications for the use of adjuvant hormonal and chemotherapy and targeted therapies were discussed. It was explained that medical treatments are dependent on pathology results including receptor studies.  The patient was advised to meet with a medical oncologist since she is HER-2 positive. But the patient declines as she is not interested in neoadjuvant chemotherapy. I discussed adjuvant therapies for HER-2 positive breast cancer and she is considering them.It was explained that some patients have further genetic testing of their tumors before a decision about chemotherapy can be made.\par \par The indications for radiation therapy and side effects were also discussed including the use varying lengths of whole breast radiation.  It was explained that radiation is generally reserved for lumpectomy patients, and patients with larger tumors or positive lymph nodes. Common side effects were reviewed. \par \par The genetics of breast cancer were discussed with the implications for risk of contralateral cancer and other associated cancers, implication for ovarian risk, options for management, and implications for family members. She is interested in genetic testing. \par \par She met with our cancer navigator and was given a cancer binder. I believe I was able to answer all of her questions to her satisfaction. I will call her with second opinion pathology and MRI results and go from there.

## 2022-11-16 NOTE — HISTORY OF PRESENT ILLNESS
[FreeTextEntry1] : This is a 82 year old woman referred by Dr. Valentino for newly diagnosed invasive ductal carcinoma of the right breast. The patient underwent  bilateral screening mammogram and bilateral ultrasound for density and right palpable abnormality at [facility] on 10/22/2022. Mammogram findings found extremely dense breast tissue. No suspicious findings were seen on mammogram. Ultrasound findings showed  a right 10:00 N2 1.6 cm hypoechoic mass in area of palpable concern. No abnormal lymph nodes were seen in the axilla. Ultrasound biopsy was recommended. The patient underwent a right 10:00 N2 (bowtie-shaped clip) on 10/31/2022. Pathology showed invasive ductal carcinoma, grade 5/9, ER strongly positive at >95%, GA negative, Her2 negative and Ki67 15-20%.\par \par The patient has no previous breast history. She c/o swallowing difficulty and her daughter states this is not an acute issue but has been ongoing for 2 years and she was told to swallow more carefully. She was born in [place] and moved to the  when she was 23 yo. She had two surgeries  as a teenager for her Crohns.\par \par She does SBE. \par She has not noticed a change in her breast or a breast lump.\par She has not noticed a change in her nipple or nipple area.\par She has not noticed a change in the skin of the breast.\par She is not experiencing nipple discharge.\par She is not experiencing breast pain.\par She has not noticed a lump or lymph node under the armpit. \par \par BREAST CANCER RISK FACTORS\par Menarche:  9\par Menopause: 39\par Grav:  3    Para: 2 (son is  and had (CIDP) demelination polyneuropathy)\par Age at first live birth: 26\par Nursed: no\par Hysterectomy: no\par Oophorectomy: no\par OCP: no\par HRT: no\par Last pap/pelvic exam: Patient does not remember\par Related family history: daughter BCA@ 56\par Ashkenazi: no\par Mastery risk assessment: n/a\par BRCA testing: no daughter negative\par \par \par Last mammogram:   10/22/2022                           Location: [facility]\par Report reviewed.                                 Images reviewed on PACS\par Results: Birads 2\par Extremely dense breast tissue. \par No evidence of malignancy\par \par \par Last ultrasound:   2022                                Location: [facility]\par Report reviewed.                                 Images reviewed on pACS\par Results: Birads 4\par Right 10:00 N2 1.6 cm hypoechoic mass in area of palpable concern. \par No abnormal lymph nodes were seen in the axilla. Rec ultrasound bx\par \par Last MRI: never                                            Location:\par Report reviewed.\par

## 2022-11-17 ENCOUNTER — NON-APPOINTMENT (OUTPATIENT)
Age: 82
End: 2022-11-17

## 2022-11-18 ENCOUNTER — TRANSCRIPTION ENCOUNTER (OUTPATIENT)
Age: 82
End: 2022-11-18

## 2022-11-18 ENCOUNTER — APPOINTMENT (OUTPATIENT)
Dept: HEMATOLOGY ONCOLOGY | Facility: CLINIC | Age: 82
End: 2022-11-18

## 2022-11-23 NOTE — DISCUSSION/SUMMARY
[FreeTextEntry1] : The visit was provided via telehealth using real-time 2-way audio visual technology. The patient, Angela Montero, was located at home, [place], [place], at the time of the visit. The provider, Billie Troy, was located at the medical office in [place], [place] at the time of the visit. The patient’s daughter, Chelo, was also present for this encounter. Verbal consent for telehealth services was given on 22 by the patient, Angela Montero.\par \par REASON FOR CONSULT\par Angela Montero is a 82-year-old female referred by Dr. Stephanie Austin for cancer genetic counseling and risk assessment due to a recent diagnosis of breast cancer. Ms. Montero was seen via telehealth on 2022 at which time medical and family history was ascertained and a pedigree constructed. Her daughter, Chelo Montero, was also present for this encounter. \par \par RELEVANT MEDICAL HISTORY\par Ms. Montero was diagnosed with a new right breast cancer at the age of 82. Pathology report revealed invasive ductal carcinoma (ER+/AR-/HER2-). Treatment plan is not yet finalized and patient is to undergo breast MRI and meet with medical oncology.\par \par OTHER MEDICAL AND SURGICAL HISTORY:\par •	Medical History: Crohn’s Disease\par •	Surgical History: appendectomy, colon resection, cholecystectomy, \par \par OB/GYN HISTORY:\par Obstetrical History: \par Age at Menarche: 9\par Menopausal Status: Post-menopausal with LMP at age 39\par Age at First Live Birth: 26\par Oral Contraceptive Use: No\par Hormone Replacement Therapy: No\par \par CANCER SCREENING HISTORY:  \par Breast: \par •	Mammography: 10/22/22- dense breasts, wnl\par •	Sonography: 22- rec right biopsy\par •	MRI: getting scheduled\par GYN:\par •	Pelvic Examination: Annual- reportedly wnl\par Colon:\par •	Colonoscopy: 10+ years ago, reported no history of polyps\par Skin:  \par •	FBSE: Yes\par •	Lesions biopsied/removed: Yes- pathology unknown, excised forehead \par \par SOCIAL HISTORY:\par •	Tobacco-product use: No\par •	Environmental exposures: No \par \par FAMILY HISTORY:\par Maternal and paternal ancestry was reported as [place]. Ashkenazi Alevism ancestry was denied. A detailed family history of cancer was ascertained, see below and scanned chart for pedigree. \par \par Ms. Montero’s daughter, Chelo, was diagnosed with breast cancer recently at the age of 56 at which time she pursued genetic testing using [facility]’s breast panel. Her testing was negative. \par \par According to Ms. Montero no one else in the family has had germline testing for cancer susceptibility. Consanguinity was denied. \par 	\par RISK ASSESSMENT:\par Ms. Montero’s personal and family history is suggestive of a hereditary cancer syndrome given her new diagnosis of breast cancer, her daughter’s history of breast cancer, and niece with breast cancer. The patient meets National Comprehensive Cancer Network (NCCN) criteria for genetic testing. Given that genetic testing may impact treatment plan, we recommended the [facility] Breast STAT Panel testing for genes associated with breast cancer (typically results within 5-12 days). This test analyzes 9 genes: RYLEE, BRCA1, BRCA2, CDH1, CHEK2, PALB2, PTEN, STK11, and TP53.\par \par The risks, benefits and limitations of genetic testing were discussed with Ms. Montero. In addition, we discussed the purpose of genetic testing and possible test results (positive, negative, inconclusive) along with associated medical management options and psychosocial implications. Insurance coverage and potential out of pocket costs were also discussed. \par \par It was explained that risk assessment is based upon medical and family history as provided and may change in the future should new information be obtained. \par \par Following our discussion, Ms. Montero consented to the above-mentioned genetic testing panel. In order to expedite testing, her daughter stated she will  an [employer] saliva kit from Dr. Austin’s office today for her mother to submit. \par \par PLAN:\par \par 1.	Ms. Montero’s daughter will  an [employer] saliva kit from Dr. Austin’s office today for her mother to submit. \par 2.	We will contact Ms. Montero to schedule a follow-up appointment once the results are available. Results from the STAT panel generally return 5-12 days after the lab has received the patient’s sample. \par \par For any additional questions please call Cancer Genetics at [phone]. \par \par \par Billie Troy MS, [facility]par Genetic Counselor, Cancer Genetics\par \par \par \par \par

## 2022-12-02 ENCOUNTER — NON-APPOINTMENT (OUTPATIENT)
Age: 82
End: 2022-12-02

## 2022-12-20 ENCOUNTER — NON-APPOINTMENT (OUTPATIENT)
Age: 82
End: 2022-12-20

## 2022-12-20 ENCOUNTER — APPOINTMENT (OUTPATIENT)
Dept: HEART AND VASCULAR | Facility: CLINIC | Age: 82
End: 2022-12-20
Payer: MEDICARE

## 2022-12-20 VITALS
DIASTOLIC BLOOD PRESSURE: 70 MMHG | BODY MASS INDEX: 19.83 KG/M2 | TEMPERATURE: 98.7 F | OXYGEN SATURATION: 95 % | HEART RATE: 87 BPM | HEIGHT: 61 IN | SYSTOLIC BLOOD PRESSURE: 140 MMHG | WEIGHT: 105 LBS

## 2022-12-20 PROCEDURE — 93000 ELECTROCARDIOGRAM COMPLETE: CPT

## 2022-12-20 PROCEDURE — 99214 OFFICE O/P EST MOD 30 MIN: CPT

## 2022-12-22 NOTE — DISCUSSION/SUMMARY
[EKG obtained to assist in diagnosis and management of assessed problem(s)] : EKG obtained to assist in diagnosis and management of assessed problem(s) [FreeTextEntry1] : 81 y/o F with recurrent Takotsubo CM, Afib w/ RVR in setting hypokalemia, CCB toxicity, HTN here for follow up\par \par # Stress CM (Takotsubo)\par - Resolved, EF normal\par Patient did not tolerate any GDMT\par \par # AFib w/ RVR\par - No recurrence of Afib, now off eliquis, BB, likely in setting of acute episode of CM / abnormal electrolytes disorder\par \par # Enlarged Aorta\par Monitoring, 4.3 cm on MRI\par \par # HTN\par - Continue Amlodipine. 2.5mg once. \par \par # Pre-op\par No active cardiac symptoms or issues, able to perform > 4 mets\par No contraindications to surgery and no further cardiac work up needed at this point

## 2022-12-22 NOTE — HISTORY OF PRESENT ILLNESS
[FreeTextEntry1] : 83 y/o F with recurrent Takotsubo CM (resolved), Afib w/ RVR in setting hypokalemia, CCB toxicity, HTN, Aortic aneurysm, Breast Ca here for follow up\par \par Cath 5/6/2021: Non-obstructive, normal EF\par \par Echo: 6/30/21: EF 65%, basal septal hypertrophy with chordal PREETHI, no WMA mild MR, mild TR, PASP 28mmHg, Aorta 3.9cm\par \par MRI of Chest: Incidentally showed AAA 4.3cm\par \par EKG 6/15/21: Sinus bradycardia HR 40, BB held\par EKG 6/29/21: Sinus rhythm, HR 84\par EKG 7/27/2021: NSR, HR 81 \par EKG 10/5/2021: NSR\par EKG 10/18/2022: NSR, HR 84\par EKG 12/20/2022: NSR, HR 78, 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

## 2023-03-22 ENCOUNTER — APPOINTMENT (OUTPATIENT)
Dept: BREAST CENTER | Facility: CLINIC | Age: 83
End: 2023-03-22
Payer: MEDICARE

## 2023-03-22 VITALS
SYSTOLIC BLOOD PRESSURE: 165 MMHG | BODY MASS INDEX: 19.83 KG/M2 | DIASTOLIC BLOOD PRESSURE: 92 MMHG | HEART RATE: 77 BPM | HEIGHT: 61 IN | WEIGHT: 105 LBS

## 2023-03-22 PROCEDURE — 99214 OFFICE O/P EST MOD 30 MIN: CPT

## 2023-03-24 NOTE — CONSULT LETTER
[Please see my note below.] : Please see my note below. mucosal edema, congestion or rhinorrhea. Right Turbinates: Not enlarged. Left Turbinates: Not enlarged. Right Sinus: No maxillary sinus tenderness or frontal sinus tenderness. Left Sinus: No maxillary sinus tenderness or frontal sinus tenderness. Mouth/Throat:      Lips: Pink. No lesions. Mouth: Mucous membranes are moist. No oral lesions. Tongue: No lesions. Palate: No mass and lesions. Pharynx: Oropharynx is clear. Uvula midline. No oropharyngeal exudate or posterior oropharyngeal erythema. Tonsils: No tonsillar exudate or tonsillar abscesses. Neck:      Thyroid: No thyroid mass, thyromegaly or thyroid tenderness. Trachea: No tracheal deviation. Comments: No cervical masses or tenderness. Musculoskeletal:      Cervical back: Normal range of motion and neck supple. Lymphadenopathy:      Cervical: No cervical adenopathy. Neurological:      Mental Status: She is alert. No mass in bilateral fom or bilateral SMG. Normal to palpation. PROCEDURE - REMOVAL OF CERUMEN IMPACTION (50668):  Obstructing cerumen impaction occluding both of the EACs  and obscuring visualization of the both tympanic membranes, was removed under otomicroscopic visualization, with instrumentation, using a Billeau wire loop  Under otomicroscopic examination, the bilateral EACs and TMs appeared to be normal.  The Both tympanic membranes appeared to be normal.  Rose reported improved hearing, back to usual level, after cerumen removal.           STACI / Alberto Villanueva / Yomaira Bradley was seen today for lymphadenopathy. Diagnoses and all orders for this visit:    Lymphadenopathy of left cervical region  Comments:  possibly was viral lymphadenitis, appears to be resolved. Bilateral impacted cerumen    Conductive hearing loss of both ears         RECOMMENDATIONS/PLAN      1. Acetaminophen (eg. Tylenol) or Ibuprofen (eg.  Advil) (over the counter medications) as [Consult Closing:] : Thank you very much for allowing me to participate in the care of this patient.  If you have any questions, please do not hesitate to contact me. needed for pain. 2. Return if lymph node enlargement recurs, or, for recurrence of symptoms of excessive ear wax, or any further ear nose throat or sinus problems. [Sincerely,] : Sincerely, [Dear  ___] : Dear  [unfilled], [Courtesy Letter:] : I had the pleasure of seeing your patient, [unfilled], in my office today. [DrLupis  ___] : Dr. BOUDREAUX [FreeTextEntry3] : Stephanie Austin MS DO\par Breast Surgeon\par [facility] \[place] [address]\par

## 2023-03-24 NOTE — ASSESSMENT
[FreeTextEntry1] : The pathology and imaging results were reviewed and discussed. She is a stage IA right breast cancer (T1cN0) HER2 negative, ER+/IN+ (AJCC 8thed).\par clinically improved with neoendocrine therapy\par plan right u/s in 6 months \par rec she speak with Dr. Braga regarding her GI complaints\par she will see Dr. Silvestre today for follow up\par discussed with her that endocrine therapy would be recommended even after surgical excision\par f/u with me 6 months\par she and her daughter have expressed understanding\par \par

## 2023-03-24 NOTE — HISTORY OF PRESENT ILLNESS
[FreeTextEntry1] : This is a 82 year old woman referred by Dr. Valentino for newly diagnosed invasive ductal carcinoma of the right breast. The patient underwent  bilateral screening mammogram and bilateral ultrasound for density and right palpable abnormality at [facility] on 10/22/2022. Mammogram findings found extremely dense breast tissue. No suspicious findings were seen on mammogram. Ultrasound findings showed  a right 10:00 N2 1.6 cm hypoechoic mass in area of palpable concern. No abnormal lymph nodes were seen in the axilla. Ultrasound biopsy was recommended. The patient underwent a right 10:00 N2 (bowtie-shaped clip) on 10/31/2022. Pathology showed invasive ductal carcinoma, grade 5/9, ER strongly positive at >95%, OH negative, Her2 negative and Ki67 15-20%.\par \par The patient has no previous breast history. She c/o swallowing difficulty and her daughter states this is not an acute issue but has been ongoing for 2 years and she was told to swallow more carefully. She was born in [place] and moved to the  when she was 21 yo. She had two surgeries  as a teenager for her Crohns.\par \par She was started on neoadjuvant endocrine therapy with letrozole 2022 then changed to anastrozole 2023 with Dr. Rose Mary Silvestre. She is concerned over the risk of macular degeneration and abdominal pain. \par \par She does SBE. \par She has not noticed a change in her breast or a breast lump.\par She has not noticed a change in her nipple or nipple area.\par She has not noticed a change in the skin of the breast.\par She is not experiencing nipple discharge.\par She is not experiencing breast pain.\par She has not noticed a lump or lymph node under the armpit. \par \par BREAST CANCER RISK FACTORS\par Menarche:  9\par Menopause: 39\par Grav:  3    Para: 2 (son is  and had (CIDP) demelination polyneuropathy)\par Age at first live birth: 26\par Nursed: no\par Hysterectomy: no\par Oophorectomy: no\par OCP: no\par HRT: no\par Last pap/pelvic exam: Patient does not remember\par Related family history: daughter BCA@ 56\par Ashkenazi: no\par Mastery risk assessment: n/a\par BRCA testing: no daughter negative\par \par \par Last mammogram:   10/22/2022                           Location: [facility]\par Report reviewed.                                 Images reviewed on PACS\par Results: Birads 2\par Extremely dense breast tissue. \par No evidence of malignancy\par \par \par Last ultrasound:   3/17/2023 right                                Location: [facility]\par Report reviewed.                                                        Images reviewed on pACS\par Results: Birads 6\par Interval decrease biopsy proven right breast carcinoma. \par \par Last MRI: 11/15/2022                                          Location: [facility]\par Report reviewed.\par Results: BI-RADS 6\par Mass and nonmass enhancement in the UO right breast at 8-10:00 measuring 5.7 x 2.2 cm with biopsy marker clip at the central aspect corresponding to biopsy-proven invasive ductal carcinoma. Separate 1.8cm indeterminate clumped nonmass enhancement in the outer retroareolar right breast extending directly posterior to the nipple. Indeterminate right axillary lymph node. Indeterminate 3mm enhancing focus in the upper slightly outer breast. Aneurysmal dilation of the thoracic aortia measuting up to 4.3cm at the ascending aorta. Cardiomegaly.

## 2023-03-24 NOTE — REVIEW OF SYSTEMS
[Feeling Tired] : feeling tired [Recent Weight Loss (___ Lbs)] : recent [unfilled] ~Ulb weight loss [Eyesight Problems] : eyesight problems [Earache] : earache [Chest Pain] : chest pain [Loss Of Hearing] : hearing loss [Shortness Of Breath] : shortness of breath [Abdominal Pain] : abdominal pain [Diarrhea] : diarrhea [Heartburn] : heartburn [Hand Pain] : hand pain [Mid Back Pain] : mid back pain [Breast Pain] : breast pain [Breast Lump] : breast lump [Dizziness] : dizziness [Easy Bleeding] : a tendency for easy bleeding [Easy Bruising] : a tendency for easy bruising [Negative] : Heme/Lymph [FreeTextEntry2] : Joint pain

## 2023-03-24 NOTE — PHYSICAL EXAM
[Normocephalic] : normocephalic [Atraumatic] : atraumatic [Supple] : supple [No Supraclavicular Adenopathy] : no supraclavicular adenopathy [Examined in the supine and seated position] : examined in the supine and seated position [Symmetrical] : symmetrical [No dominant masses] : no dominant masses left breast [No Nipple Retraction] : no left nipple retraction [No Nipple Discharge] : no left nipple discharge [No Edema] : no edema [No Rashes] : no rashes [No Ulceration] : no ulceration [No Axillary Lymphadenopathy] : no right axillary lymphadenopathy [de-identified] : there is now a 1.5cm mass UOQ which encompasses the majority of her outer quadrant, the skin is not involved [de-identified] : there is a skin tear left forearm, re-dressed

## 2023-04-12 ENCOUNTER — NON-APPOINTMENT (OUTPATIENT)
Age: 83
End: 2023-04-12

## 2023-05-16 ENCOUNTER — APPOINTMENT (OUTPATIENT)
Dept: HEART AND VASCULAR | Facility: CLINIC | Age: 83
End: 2023-05-16

## 2023-06-06 ENCOUNTER — APPOINTMENT (OUTPATIENT)
Dept: HEART AND VASCULAR | Facility: CLINIC | Age: 83
End: 2023-06-06
Payer: MEDICARE

## 2023-06-06 VITALS
HEIGHT: 61 IN | OXYGEN SATURATION: 97 % | WEIGHT: 110 LBS | TEMPERATURE: 98.2 F | BODY MASS INDEX: 20.77 KG/M2 | SYSTOLIC BLOOD PRESSURE: 140 MMHG | DIASTOLIC BLOOD PRESSURE: 82 MMHG | HEART RATE: 92 BPM

## 2023-06-06 PROCEDURE — 99214 OFFICE O/P EST MOD 30 MIN: CPT

## 2023-06-06 PROCEDURE — 93000 ELECTROCARDIOGRAM COMPLETE: CPT

## 2023-06-07 ENCOUNTER — NON-APPOINTMENT (OUTPATIENT)
Age: 83
End: 2023-06-07

## 2023-06-07 NOTE — HISTORY OF PRESENT ILLNESS
[FreeTextEntry1] : 84 y/o F with Takotsubo CM (resolved), Afib w/ RVR in setting hypokalemia, CCB toxicity, HTN, Aortic aneurysm, Breast Ca here for follow up\par \par Cath 5/6/2021: Non-obstructive, normal EF\par \par Echo: 6/30/21: EF 65%, basal septal hypertrophy with chordal PREETHI, no WMA mild MR, mild TR, PASP 28mmHg, Aorta 3.9cm\par \par MRI of Chest: Incidentally showed AAA 4.3cm\par \par EKG 6/15/21: Sinus bradycardia HR 40, BB held\par EKG 6/29/21: Sinus rhythm, HR 84\par EKG 7/27/2021: NSR, HR 81 \par EKG 10/5/2021: NSR\par EKG 10/18/2022: NSR, HR 84\par EKG 12/20/2022: NSR, HR 78, 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

## 2023-06-07 NOTE — DISCUSSION/SUMMARY
[___ Month(s)] : in [unfilled] month(s) [EKG obtained to assist in diagnosis and management of assessed problem(s)] : EKG obtained to assist in diagnosis and management of assessed problem(s) [FreeTextEntry1] : 82 y/o F with recurrent Takotsubo CM, Afib w/ RVR in setting hypokalemia, CCB toxicity, HTN here for follow up\par \par # Stress CM (Takotsubo)\par - Resolved, EF normal\par Patient did not tolerate any GDMT\par \par # AFib w/ RVR\par - No recurrence of Afib, now off eliquis, BB, likely in setting of acute episode of CM / abnormal electrolytes disorder\par \par # Enlarged Aorta\par Monitoring, 4.3 cm on MRI\par \par # HTN\par - BPs slightly above goal today, however lower at home, she is taking Amlodipine 2.5 / 5mg\par - Will switch to Amlodipine 2.5mg BID

## 2023-06-09 ENCOUNTER — APPOINTMENT (OUTPATIENT)
Dept: BREAST CENTER | Facility: CLINIC | Age: 83
End: 2023-06-09
Payer: MEDICARE

## 2023-06-09 VITALS
HEART RATE: 81 BPM | BODY MASS INDEX: 20.77 KG/M2 | DIASTOLIC BLOOD PRESSURE: 97 MMHG | HEIGHT: 61 IN | WEIGHT: 110 LBS | SYSTOLIC BLOOD PRESSURE: 152 MMHG

## 2023-06-09 PROCEDURE — 99214 OFFICE O/P EST MOD 30 MIN: CPT

## 2023-06-09 NOTE — CONSULT LETTER
[Dear  ___] : Dear  [unfilled], [Courtesy Letter:] : I had the pleasure of seeing your patient, [unfilled], in my office today. [Please see my note below.] : Please see my note below. [Consult Closing:] : Thank you very much for allowing me to participate in the care of this patient.  If you have any questions, please do not hesitate to contact me. [Sincerely,] : Sincerely, [DrLupis  ___] : Dr. BOUDREAUX [FreeTextEntry3] : Stephanie Austin MS DO\par Breast Surgeon\par [facility] \[place] [address]\par

## 2023-06-09 NOTE — PHYSICAL EXAM
[Normocephalic] : normocephalic [Atraumatic] : atraumatic [Supple] : supple [No Supraclavicular Adenopathy] : no supraclavicular adenopathy [Examined in the supine and seated position] : examined in the supine and seated position [Symmetrical] : symmetrical [No dominant masses] : no dominant masses left breast [No Nipple Retraction] : no left nipple retraction [No Nipple Discharge] : no left nipple discharge [No Axillary Lymphadenopathy] : no left axillary lymphadenopathy [No Edema] : no edema [No Rashes] : no rashes [No Ulceration] : no ulceration [de-identified] : there is now a 0.5cm mass UOQ which no longer encompasses the majority of her outer quadrant, the skin is not involved [de-identified] : there is a skin tear left forearm, re-dressed

## 2023-06-09 NOTE — ASSESSMENT
[FreeTextEntry1] : The pathology and imaging results were reviewed and discussed. She is a stage IA right breast cancer (T1cN0) HER2 negative, ER+/MA+ (AJCC 8thed).\par clinically improved with neoendocrine therapy\par \par rec she speak with Dr. Braga regarding her GI complaints\par she will see Dr. Silvestre today for follow up\par discussed with her that endocrine therapy would be recommended even after surgical excision\par plan right u/s in SEPT as planned\par f/u with me SEPT 2023\par she and her daughter have expressed understanding\par \par

## 2023-06-09 NOTE — HISTORY OF PRESENT ILLNESS
[FreeTextEntry1] : This is a 83 year old woman referred by Dr. Valentino for newly diagnosed invasive ductal carcinoma of the right breast. The patient underwent  bilateral screening mammogram and bilateral ultrasound for density and right palpable abnormality at [facility] on 10/22/2022. Mammogram findings found extremely dense breast tissue. No suspicious findings were seen on mammogram. Ultrasound findings showed  a right 10:00 N2 1.6 cm hypoechoic mass in area of palpable concern. No abnormal lymph nodes were seen in the axilla. Ultrasound biopsy was recommended. The patient underwent a right 10:00 N2 (bowtie-shaped clip) on 10/31/2022. Pathology showed invasive ductal carcinoma, grade 5/9, ER strongly positive at >95%, LA negative, Her2 negative and Ki67 15-20%.\par \par The patient has no previous breast history. She c/o swallowing difficulty and her daughter states this is not an acute issue but has been ongoing for 2 years and she was told to swallow more carefully. She was born in [place] and moved to the  when she was 23 yo. She had two surgeries  as a teenager for her Crohns.\par \par She was started on neoadjuvant endocrine therapy with letrozole 2022 then changed to anastrozole 2023 with Dr. Rose Mary Silvestre. She is concerned over the risk of macular degeneration and abdominal pain. She has MRI next week for abdominal evaluation. \par \par She does SBE. \par She has not noticed a change in her breast or a breast lump.\par She has not noticed a change in her nipple or nipple area.\par She has not noticed a change in the skin of the breast.\par She is not experiencing nipple discharge.\par She is not experiencing breast pain.\par She has not noticed a lump or lymph node under the armpit. \par \par BREAST CANCER RISK FACTORS\par Menarche:  9\par Menopause: 39\par Grav:  3    Para: 2 (son is  and had (CIDP) demelination polyneuropathy)\par Age at first live birth: 26\par Nursed: no\par Hysterectomy: no\par Oophorectomy: no\par OCP: no\par HRT: no\par Last pap/pelvic exam: Patient does not remember\par Related family history: daughter BCA@ 56\par Ashkenazi: no\par Mastery risk assessment: n/a\par BRCA testing: no daughter negative\par \par \par Last mammogram:   10/22/2022                           Location: [facility]\par Report reviewed.                                 Images reviewed on PACS\par Results: Birads 2\par Extremely dense breast tissue. \par No evidence of malignancy\par \par \par Last ultrasound:   3/17/2023 right                                Location: [facility]\par Report reviewed.                                                        Images reviewed on pACS\par Results: Birads 6\par Interval decrease biopsy proven right breast carcinoma. \par \par Last MRI: 11/15/2022                                          Location: [facility]\par Report reviewed.\par Results: BI-RADS 6\par Mass and nonmass enhancement in the UO right breast at 8-10:00 measuring 5.7 x 2.2 cm with biopsy marker clip at the central aspect corresponding to biopsy-proven invasive ductal carcinoma. Separate 1.8cm indeterminate clumped nonmass enhancement in the outer retroareolar right breast extending directly posterior to the nipple. Indeterminate right axillary lymph node. Indeterminate 3mm enhancing focus in the upper slightly outer breast. Aneurysmal dilation of the thoracic aortia measuting up to 4.3cm at the ascending aorta. Cardiomegaly.

## 2023-07-10 ENCOUNTER — APPOINTMENT (OUTPATIENT)
Dept: VASCULAR SURGERY | Facility: CLINIC | Age: 83
End: 2023-07-10
Payer: MEDICARE

## 2023-07-10 VITALS — DIASTOLIC BLOOD PRESSURE: 85 MMHG | HEART RATE: 103 BPM | SYSTOLIC BLOOD PRESSURE: 149 MMHG

## 2023-07-10 PROCEDURE — 93971 EXTREMITY STUDY: CPT

## 2023-07-10 PROCEDURE — 99203 OFFICE O/P NEW LOW 30 MIN: CPT

## 2023-07-10 RX ORDER — FEBUXOSTAT 40 MG/1
40 TABLET ORAL DAILY
Refills: 0 | Status: DISCONTINUED | COMMUNITY
End: 2023-07-10

## 2023-07-10 NOTE — REASON FOR VISIT
[Initial Evaluation] : an initial evaluation [FreeTextEntry1] : right lower extremity skin discoloration

## 2023-07-10 NOTE — ASSESSMENT
[FreeTextEntry1] : 82 yo female with right lower extremity skin discoloration. She has no edema on physical exam. She underwent a venous duplex which was negative for venous insufficiency. 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 There is no indication for vascular intervention at this time. She was instructed to make an appointment with a dermatologist.

## 2023-07-10 NOTE — REVIEW OF SYSTEMS
[Fever] : no fever [Chills] : no chills [Leg Claudication] : no intermittent leg claudication [Lower Ext Edema] : no lower extremity edema [Limb Pain] : no limb pain [Limb Swelling] : no limb swelling [As Noted in HPI] : as noted in HPI [Difficulty Walking] : no difficulty walking

## 2023-07-10 NOTE — PHYSICAL EXAM
[JVD] : no jugular venous distention  [Normal Breath Sounds] : Normal breath sounds [Normal Rate and Rhythm] : normal rate and rhythm [2+] : left 2+ [Ankle Swelling (On Exam)] : not present [Ankle Swelling Bilaterally] : bilaterally  [Varicose Veins Of Lower Extremities] : bilaterally [Ankle Swelling On The Right] : mild [] : bilaterally [Alert] : alert [Oriented to Person] : oriented to person [Oriented to Place] : oriented to place [Oriented to Time] : oriented to time [de-identified] : Awake and Alert [de-identified] : discoloration of right shin [de-identified] : Appropriate

## 2023-07-10 NOTE — HISTORY OF PRESENT ILLNESS
[FreeTextEntry1] : 84 yo female presents to the office with a chief complaint of right lower extremity skin discoloration. She reports that she develops occasional pain in her right leg. She denies lower extremity edema. She denies pain in her calves with ambulation. She denies a history of a DVT or SVT. She does not wear compression stocking.

## 2023-08-29 ENCOUNTER — APPOINTMENT (OUTPATIENT)
Dept: HEART AND VASCULAR | Facility: CLINIC | Age: 83
End: 2023-08-29
Payer: MEDICARE

## 2023-08-29 VITALS
SYSTOLIC BLOOD PRESSURE: 136 MMHG | HEIGHT: 61 IN | DIASTOLIC BLOOD PRESSURE: 80 MMHG | WEIGHT: 109.13 LBS | BODY MASS INDEX: 20.6 KG/M2 | HEART RATE: 85 BPM | TEMPERATURE: 98 F | OXYGEN SATURATION: 97 %

## 2023-08-29 PROCEDURE — 99213 OFFICE O/P EST LOW 20 MIN: CPT

## 2023-08-31 NOTE — HISTORY OF PRESENT ILLNESS
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 Here today to discuss BP meds

## 2023-08-31 NOTE — DISCUSSION/SUMMARY
[FreeTextEntry1] : 84 y/o F with recurrent Takotsubo CM, Afib w/ RVR in setting hypokalemia, CCB toxicity, HTN here for follow up  # Stress CM (Takotsubo) - Resolved, EF normal Patient did not tolerate any GDMT  # AFib w/ RVR - No recurrence of Afib, now off eliquis, BB, likely in setting of acute episode of CM / abnormal electrolytes disorder  # Enlarged Aorta Monitoring, 4.3 cm on MRI  # HTN BPs at goal today Continue Amlodipine 2.5mg BID

## 2023-09-20 ENCOUNTER — APPOINTMENT (OUTPATIENT)
Dept: BREAST CENTER | Facility: CLINIC | Age: 83
End: 2023-09-20
Payer: MEDICARE

## 2023-09-20 VITALS
WEIGHT: 109 LBS | SYSTOLIC BLOOD PRESSURE: 159 MMHG | BODY MASS INDEX: 20.58 KG/M2 | HEART RATE: 77 BPM | HEIGHT: 61 IN | DIASTOLIC BLOOD PRESSURE: 90 MMHG

## 2023-09-20 PROCEDURE — 99214 OFFICE O/P EST MOD 30 MIN: CPT

## 2023-09-20 RX ORDER — ANASTROZOLE TABLETS 1 MG/1
TABLET ORAL
Refills: 0 | Status: ACTIVE | COMMUNITY

## 2023-11-13 ENCOUNTER — NON-APPOINTMENT (OUTPATIENT)
Age: 83
End: 2023-11-13

## 2023-11-21 ENCOUNTER — APPOINTMENT (OUTPATIENT)
Dept: SURGERY | Facility: CLINIC | Age: 83
End: 2023-11-21

## 2023-11-28 ENCOUNTER — APPOINTMENT (OUTPATIENT)
Dept: SURGERY | Facility: CLINIC | Age: 83
End: 2023-11-28

## 2024-01-16 ENCOUNTER — APPOINTMENT (OUTPATIENT)
Dept: HEART AND VASCULAR | Facility: CLINIC | Age: 84
End: 2024-01-16
Payer: MEDICARE

## 2024-01-16 VITALS
HEART RATE: 95 BPM | TEMPERATURE: 98.7 F | DIASTOLIC BLOOD PRESSURE: 70 MMHG | OXYGEN SATURATION: 98 % | HEIGHT: 61 IN | SYSTOLIC BLOOD PRESSURE: 110 MMHG

## 2024-01-16 PROCEDURE — 99214 OFFICE O/P EST MOD 30 MIN: CPT

## 2024-01-17 NOTE — DISCUSSION/SUMMARY
[FreeTextEntry1] : 82 y/o F with recurrent Takotsubo CM, Afib w/ RVR in setting hypokalemia, CCB toxicity, HTN here for follow up  # Stress CM (Takotsubo) - Resolved, EF normal Patient did not tolerate any GDMT  # AFib w/ RVR - No recurrence of Afib, now off eliquis, BB, likely in setting of acute episode of CM / abnormal electrolytes disorder  # Enlarged Aorta Monitoring, 4.3 cm on MRI  # HTN BPs low Will change Amlodipine 2.5mg BID -> once a day

## 2024-01-17 NOTE — HISTORY OF PRESENT ILLNESS
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

## 2024-02-27 ENCOUNTER — APPOINTMENT (OUTPATIENT)
Dept: HEART AND VASCULAR | Facility: CLINIC | Age: 84
End: 2024-02-27
Payer: MEDICARE

## 2024-02-27 VITALS
BODY MASS INDEX: 20.2 KG/M2 | OXYGEN SATURATION: 98 % | SYSTOLIC BLOOD PRESSURE: 116 MMHG | HEIGHT: 61 IN | HEART RATE: 95 BPM | DIASTOLIC BLOOD PRESSURE: 60 MMHG | WEIGHT: 107 LBS

## 2024-02-27 PROCEDURE — 99214 OFFICE O/P EST MOD 30 MIN: CPT

## 2024-03-01 NOTE — HISTORY OF PRESENT ILLNESS
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

## 2024-03-01 NOTE — DISCUSSION/SUMMARY
[FreeTextEntry1] : 84 y/o F with recurrent Takotsubo CM, Afib w/ RVR in setting hypokalemia, CCB toxicity here for follow up  # Stress CM - Resolved  # AFib w/ RVR - No recurrence of Afib, now off eliquis, BB, likely in setting of acute episode of CM / abnormal eletrolytes disorder  # Enlarged Aorta Monitoring  # HTN - Continue Amlodipine. 2.5mg once a day Advised patient to stay hydrated

## 2024-03-01 NOTE — PHYSICAL EXAM
[Well Developed] : well developed [Well Nourished] : well nourished [Frail] : frail [No Acute Distress] : no acute distress [Normal Conjunctiva] : normal conjunctiva [Normal Venous Pressure] : normal venous pressure [No Carotid Bruit] : no carotid bruit [Normal S1, S2] : normal S1, S2 [No Murmur] : no murmur [No Rub] : no rub [Clear Lung Fields] : clear lung fields [No Gallop] : no gallop [No Respiratory Distress] : no respiratory distress  [Good Air Entry] : good air entry [Non Tender] : non-tender [Soft] : abdomen soft [No Masses/organomegaly] : no masses/organomegaly [Normal Bowel Sounds] : normal bowel sounds [No Edema] : no edema [Normal Gait] : normal gait [No Cyanosis] : no cyanosis [No Clubbing] : no clubbing [No Varicosities] : no varicosities [No Rash] : no rash [No Skin Lesions] : no skin lesions [Moves all extremities] : moves all extremities [No Focal Deficits] : no focal deficits [Normal Speech] : normal speech [Alert and Oriented] : alert and oriented [Normal memory] : normal memory

## 2024-03-12 ENCOUNTER — APPOINTMENT (OUTPATIENT)
Dept: INTERNAL MEDICINE | Facility: CLINIC | Age: 84
End: 2024-03-12

## 2024-04-09 RX ORDER — AMLODIPINE BESYLATE 2.5 MG/1
2.5 TABLET ORAL TWICE DAILY
Qty: 180 | Refills: 3 | Status: ACTIVE | COMMUNITY
Start: 2022-10-18 | End: 1900-01-01

## 2024-04-10 ENCOUNTER — APPOINTMENT (OUTPATIENT)
Dept: BREAST CENTER | Facility: CLINIC | Age: 84
End: 2024-04-10
Payer: MEDICARE

## 2024-04-10 VITALS
HEIGHT: 61 IN | SYSTOLIC BLOOD PRESSURE: 153 MMHG | HEART RATE: 89 BPM | DIASTOLIC BLOOD PRESSURE: 89 MMHG | WEIGHT: 110 LBS | BODY MASS INDEX: 20.77 KG/M2

## 2024-04-10 DIAGNOSIS — C50.411 MALIGNANT NEOPLASM OF UPPER-OUTER QUADRANT OF RIGHT FEMALE BREAST: ICD-10-CM

## 2024-04-10 DIAGNOSIS — R92.30 DENSE BREASTS, UNSPECIFIED: ICD-10-CM

## 2024-04-10 DIAGNOSIS — Z17.0 MALIGNANT NEOPLASM OF UPPER-OUTER QUADRANT OF RIGHT FEMALE BREAST: ICD-10-CM

## 2024-04-10 DIAGNOSIS — Z80.3 FAMILY HISTORY OF MALIGNANT NEOPLASM OF BREAST: ICD-10-CM

## 2024-04-10 PROCEDURE — 99215 OFFICE O/P EST HI 40 MIN: CPT

## 2024-04-11 PROBLEM — Z80.3 FAMILY HISTORY OF MALIGNANT NEOPLASM OF BREAST: Status: ACTIVE | Noted: 2022-11-09

## 2024-04-11 PROBLEM — C50.411 MALIGNANT NEOPLASM OF UPPER-OUTER QUADRANT OF RIGHT BREAST IN FEMALE, ESTROGEN RECEPTOR POSITIVE: Status: ACTIVE | Noted: 2022-11-09

## 2024-04-11 PROBLEM — R92.30 DENSE BREAST TISSUE: Status: ACTIVE | Noted: 2023-09-21

## 2024-04-11 RX ORDER — FEBUXOSTAT 80 MG/1
TABLET ORAL
Refills: 0 | Status: ACTIVE | COMMUNITY

## 2024-04-11 RX ORDER — ASCORBIC ACID 125 MG
TABLET,CHEWABLE ORAL
Refills: 0 | Status: ACTIVE | COMMUNITY

## 2024-04-11 NOTE — CONSULT LETTER
[Dear  ___] : Dear  [unfilled], [Courtesy Letter:] : I had the pleasure of seeing your patient, [unfilled], in my office today. [Please see my note below.] : Please see my note below. [Consult Closing:] : Thank you very much for allowing me to participate in the care of this patient.  If you have any questions, please do not hesitate to contact me. [Sincerely,] : Sincerely, [DrLupis  ___] : Dr. BOUDREAUX [FreeTextEntry3] : Stephanie Austin MS DO\par  Breast Surgeon\par  [facility] \[place]  [place] [address]\par

## 2024-04-11 NOTE — PHYSICAL EXAM
[Normocephalic] : normocephalic [Atraumatic] : atraumatic [Supple] : supple [No Supraclavicular Adenopathy] : no supraclavicular adenopathy [Examined in the supine and seated position] : examined in the supine and seated position [Symmetrical] : symmetrical [No dominant masses] : no dominant masses left breast [No Nipple Retraction] : no left nipple retraction [No Nipple Discharge] : no left nipple discharge [No Axillary Lymphadenopathy] : no left axillary lymphadenopathy [No Edema] : no edema [No Rashes] : no rashes [No Ulceration] : no ulceration [de-identified] : there is now a 1cm mass UOQ which no longer encompasses the majority of her outer quadrant, the skin is not involved [de-identified] : there is a skin tear left forearm, re-dressed

## 2024-04-11 NOTE — HISTORY OF PRESENT ILLNESS
[FreeTextEntry1] : This is a 83 year old woman referred by Dr. Valentino for newly diagnosed invasive ductal carcinoma of the right breast. The patient underwent  bilateral screening mammogram and bilateral ultrasound for density and right palpable abnormality at [facility] on 10/22/2022. Mammogram findings found extremely dense breast tissue. No suspicious findings were seen on mammogram. Ultrasound findings showed  a right 10:00 N2 1.6 cm hypoechoic mass in area of palpable concern. No abnormal lymph nodes were seen in the axilla. Ultrasound biopsy was recommended. The patient underwent a right 10:00 N2 (bowtie-shaped clip) on 10/31/2022. Pathology showed invasive ductal carcinoma, grade 5/9, ER strongly positive at >95%, PA negative, Her2 negative and Ki67 15-20%.  The patient has no previous breast history. She c/o swallowing difficulty and her daughter states this is not an acute issue but has been ongoing for 2 years and she was told to swallow more carefully. She was born in [place] and moved to the  when she was 21 yo. She had two surgeries  as a teenager for her Crohns.  She was started on neoadjuvant endocrine therapy with letrozole 2022 then changed to anastrozole 2023 with Dr. Rose Mary Silvestre. She is concerned over the risk of macular degeneration and abdominal pain. She had a 3 week trip to [place] in October. She is upset about getting scratched by a dog over [facility]. She c/o an abdominal hernia which she was apparently told to not have repaired. She saw vascular surgery for an "ice cold water running down her leg" sensation and was told she had normal blood flow. She has shoulder pain and also c/o vertigo.   She does SBE.  She has not noticed a change in her breast or a breast lump. She has not noticed a change in her nipple or nipple area. She has not noticed a change in the skin of the breast. She is not experiencing nipple discharge. She is  experiencin right  breast pain with pressure. She has not noticed a lump or lymph node under the armpit.   BREAST CANCER RISK FACTORS Menarche:  9 Menopause: 39 Grav:  3    Para: 2 (son is  and had (CIDP) demelination polyneuropathy) Age at first live birth: 26 Nursed: no Hysterectomy: no Oophorectomy: no OCP: no HRT: no Last pap/pelvic exam: Patient does not remember Related family history: daughter BCA@ 56 Ashkenazi: no Mastery risk assessment: n/a BRCA testing: no daughter negative   Last mammogram:   11/10/2023                           Location: [facility] Report reviewed.                                              Images reviewed on PACS Results: Birads 6 Extremely dense breast tissue.  Bx proven right breast malignancy.    Last ultrasound:  2024 right                                 Location: [facility] Report reviewed.                                                        Images reviewed on pACS Results: Birads 6 Biopsy-proven carcinoma right breast carcinoma. In the 10 o'clock or the right breast 2 cm fn, a heterogeneous mass measuring 2.7 x 1.5 x 0.9 cm is not significantly changed and consistent with the patients bx proven carcinoma. (original size was 4.7cm)  Last MRI: 11/15/2022                                          Location: [facility] Report reviewed. Results: BI-RADS 6 Mass and nonmass enhancement in the UO right breast at 8-10:00 measuring 5.7 x 2.2 cm with biopsy marker clip at the central aspect corresponding to biopsy-proven invasive ductal carcinoma. Separate 1.8cm indeterminate clumped nonmass enhancement in the outer retroareolar right breast extending directly posterior to the nipple. Indeterminate right axillary lymph node. Indeterminate 3mm enhancing focus in the upper slightly outer breast. Aneurysmal dilation of the thoracic aortia measuting up to 4.3cm at the ascending aorta. Cardiomegaly.

## 2024-04-11 NOTE — ASSESSMENT
[FreeTextEntry1] : The pathology and imaging results were reviewed and discussed. She is a stage IA right breast cancer (T1cN0) HER2 negative, ER+/TN+ ([facility]CC 8thed). clinically improved with neoendocrine therapy plan mg/sono NOV 2024 rec she speak with Dr. Braga regarding her GI complaints she will see Dr. Silvestre for follow up discussed with her that endocrine therapy would be recommended even after surgical excision I reviewed overview of surgical excision and what that would entail and post op recovery and possible need for rtx, she is hesitant to proceed with surgery at this time f/u with me 6 months after imaging she and her care-taker have expressed understanding

## 2024-04-16 ENCOUNTER — NON-APPOINTMENT (OUTPATIENT)
Age: 84
End: 2024-04-16

## 2024-04-16 ENCOUNTER — APPOINTMENT (OUTPATIENT)
Dept: HEART AND VASCULAR | Facility: CLINIC | Age: 84
End: 2024-04-16
Payer: MEDICARE

## 2024-04-16 VITALS
BODY MASS INDEX: 20.77 KG/M2 | TEMPERATURE: 98.7 F | SYSTOLIC BLOOD PRESSURE: 135 MMHG | DIASTOLIC BLOOD PRESSURE: 70 MMHG | HEIGHT: 61 IN | WEIGHT: 110 LBS | HEART RATE: 74 BPM | OXYGEN SATURATION: 98 %

## 2024-04-16 PROCEDURE — 99214 OFFICE O/P EST MOD 30 MIN: CPT

## 2024-04-22 NOTE — HISTORY OF PRESENT ILLNESS
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

## 2024-04-22 NOTE — DISCUSSION/SUMMARY
[FreeTextEntry1] : 84 y/o F with recurrent Takotsubo CM, Afib w/ RVR in setting hypokalemia, CCB toxicity here for follow up  # Stress CM - Resolved  # AFib w/ RVR - No recurrence of Afib, now off eliquis, BB, likely in setting of acute episode of CM / abnormal electrolytes disorder  # Enlarged Aorta Monitoring  # HTN - Continue Amlodipine. 2.5mg will increase to BID Advised patient to stay hydrated.

## 2024-05-06 ENCOUNTER — APPOINTMENT (OUTPATIENT)
Dept: VASCULAR SURGERY | Facility: CLINIC | Age: 84
End: 2024-05-06
Payer: MEDICARE

## 2024-05-06 VITALS
HEART RATE: 85 BPM | HEIGHT: 61 IN | WEIGHT: 110 LBS | DIASTOLIC BLOOD PRESSURE: 98 MMHG | BODY MASS INDEX: 20.77 KG/M2 | SYSTOLIC BLOOD PRESSURE: 172 MMHG

## 2024-05-06 DIAGNOSIS — D69.2 OTHER NONTHROMBOCYTOPENIC PURPURA: ICD-10-CM

## 2024-05-06 PROCEDURE — 99213 OFFICE O/P EST LOW 20 MIN: CPT

## 2024-05-06 NOTE — ASSESSMENT
[FreeTextEntry1] : 82 yo female with bilateral lower extremity skin discoloration. She has no edema on physical exam.  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 There is no indication for vascular intervention at this time. I recommended that she continue to wear compression stockings on a daily basis.

## 2024-05-06 NOTE — PHYSICAL EXAM
[JVD] : no jugular venous distention  [Normal Breath Sounds] : Normal breath sounds [Normal Rate and Rhythm] : normal rate and rhythm [2+] : left 2+ [Ankle Swelling (On Exam)] : not present [Ankle Swelling Bilaterally] : bilaterally  [Varicose Veins Of Lower Extremities] : bilaterally [Ankle Swelling On The Right] : mild [] : bilaterally [Alert] : alert [Oriented to Person] : oriented to person [Oriented to Place] : oriented to place [Oriented to Time] : oriented to time [de-identified] : Awake and Alert [de-identified] : discoloration of bilateral shins  [de-identified] : Appropriate

## 2024-05-06 NOTE — HISTORY OF PRESENT ILLNESS
[FreeTextEntry1] : 84 yo female presents to the office with a chief complaint of right lower extremity skin discoloration. She reports that she develops occasional pain in her right leg. She denies lower extremity edema. She denies pain in her calves with ambulation. She denies a history of a DVT or SVT. She does not wear compression stocking.  [de-identified] : 82 yo female returns to the office for a chief complaint of easy bruising and skin discoloration of bilateral lower extremities. She reports that she occasionally feels like water is running down her legs. She denies lower extremity edema. She wears mild compression stockings daily. She denies pain with ambulation.

## 2024-09-17 ENCOUNTER — APPOINTMENT (OUTPATIENT)
Dept: HEART AND VASCULAR | Facility: CLINIC | Age: 84
End: 2024-09-17
Payer: MEDICARE

## 2024-09-17 ENCOUNTER — NON-APPOINTMENT (OUTPATIENT)
Age: 84
End: 2024-09-17

## 2024-09-17 VITALS
BODY MASS INDEX: 20.01 KG/M2 | OXYGEN SATURATION: 98 % | HEART RATE: 97 BPM | TEMPERATURE: 98.7 F | SYSTOLIC BLOOD PRESSURE: 140 MMHG | DIASTOLIC BLOOD PRESSURE: 85 MMHG | HEIGHT: 61 IN | WEIGHT: 106 LBS

## 2024-09-17 PROCEDURE — 99214 OFFICE O/P EST MOD 30 MIN: CPT

## 2024-09-17 PROCEDURE — 93000 ELECTROCARDIOGRAM COMPLETE: CPT

## 2024-09-24 NOTE — REVIEW OF SYSTEMS
[Feeling Fatigued] : feeling fatigued [Depression] : depression [Under Stress] : under stress [Negative] : Heme/Lymph

## 2024-09-24 NOTE — HISTORY OF PRESENT ILLNESS
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

## 2024-09-24 NOTE — DISCUSSION/SUMMARY
[FreeTextEntry1] : 83 y/o F with recurrent Takotsubo CM, Afib w/ RVR in setting hypokalemia, CCB toxicity here for follow up  # Stress CM - Resolved  # AFib w/ RVR - No recurrence of Afib, now off eliquis, BB, likely in setting of acute episode of CM / abnormal electrolytes disorder  # Enlarged Aorta Monitoring  # HTN - Continue Amlodipine. 2.5mg BID Advised patient to stay hydrated. [EKG obtained to assist in diagnosis and management of assessed problem(s)] : EKG obtained to assist in diagnosis and management of assessed problem(s)

## 2024-11-12 ENCOUNTER — NON-APPOINTMENT (OUTPATIENT)
Age: 84
End: 2024-11-12

## 2024-11-12 ENCOUNTER — APPOINTMENT (OUTPATIENT)
Dept: HEART AND VASCULAR | Facility: CLINIC | Age: 84
End: 2024-11-12
Payer: MEDICARE

## 2024-11-12 VITALS
OXYGEN SATURATION: 95 % | TEMPERATURE: 97.8 F | DIASTOLIC BLOOD PRESSURE: 85 MMHG | SYSTOLIC BLOOD PRESSURE: 125 MMHG | HEIGHT: 61 IN | WEIGHT: 106 LBS | HEART RATE: 92 BPM | BODY MASS INDEX: 20.01 KG/M2

## 2024-11-12 PROCEDURE — 93000 ELECTROCARDIOGRAM COMPLETE: CPT

## 2024-11-12 PROCEDURE — 99214 OFFICE O/P EST MOD 30 MIN: CPT

## 2024-11-12 RX ORDER — CYANOCOBALAMIN 1000 UG/ML
1000 INJECTION, SOLUTION INTRAMUSCULAR; SUBCUTANEOUS
Refills: 0 | Status: ACTIVE | COMMUNITY

## 2024-11-12 RX ORDER — FEBUXOSTAT 40 MG/1
40 TABLET ORAL
Refills: 0 | Status: ACTIVE | COMMUNITY

## 2024-11-12 RX ORDER — DULOXETINE HYDROCHLORIDE 20 MG/1
20 CAPSULE, DELAYED RELEASE PELLETS ORAL
Refills: 0 | Status: ACTIVE | COMMUNITY

## 2024-11-12 RX ORDER — HYDROCORTISONE 25 MG/G
2.5 CREAM TOPICAL
Refills: 0 | Status: ACTIVE | COMMUNITY

## 2024-11-12 RX ORDER — DICLOFENAC SODIUM 10 MG/G
1 GEL TOPICAL
Refills: 0 | Status: ACTIVE | COMMUNITY

## 2024-12-09 ENCOUNTER — APPOINTMENT (OUTPATIENT)
Dept: BREAST CENTER | Facility: CLINIC | Age: 84
End: 2024-12-09
Payer: MEDICARE

## 2024-12-09 VITALS
SYSTOLIC BLOOD PRESSURE: 144 MMHG | WEIGHT: 106 LBS | HEART RATE: 77 BPM | BODY MASS INDEX: 20.01 KG/M2 | DIASTOLIC BLOOD PRESSURE: 93 MMHG | HEIGHT: 61 IN

## 2024-12-09 DIAGNOSIS — Z17.0 MALIGNANT NEOPLASM OF UPPER-OUTER QUADRANT OF RIGHT FEMALE BREAST: ICD-10-CM

## 2024-12-09 DIAGNOSIS — Z12.31 ENCOUNTER FOR SCREENING MAMMOGRAM FOR MALIGNANT NEOPLASM OF BREAST: ICD-10-CM

## 2024-12-09 DIAGNOSIS — R92.30 DENSE BREASTS, UNSPECIFIED: ICD-10-CM

## 2024-12-09 DIAGNOSIS — C50.411 MALIGNANT NEOPLASM OF UPPER-OUTER QUADRANT OF RIGHT FEMALE BREAST: ICD-10-CM

## 2024-12-09 DIAGNOSIS — R92.2 INCONCLUSIVE MAMMOGRAM: ICD-10-CM

## 2024-12-09 PROCEDURE — 99214 OFFICE O/P EST MOD 30 MIN: CPT

## 2025-02-03 ENCOUNTER — APPOINTMENT (OUTPATIENT)
Dept: SURGERY | Facility: CLINIC | Age: 85
End: 2025-02-03
Payer: MEDICARE

## 2025-02-03 VITALS — TEMPERATURE: 98.4 F | SYSTOLIC BLOOD PRESSURE: 126 MMHG | DIASTOLIC BLOOD PRESSURE: 88 MMHG | HEART RATE: 92 BPM

## 2025-02-03 PROCEDURE — 99213 OFFICE O/P EST LOW 20 MIN: CPT

## 2025-02-10 ENCOUNTER — NON-APPOINTMENT (OUTPATIENT)
Age: 85
End: 2025-02-10

## 2025-02-12 ENCOUNTER — APPOINTMENT (OUTPATIENT)
Dept: SURGERY | Facility: CLINIC | Age: 85
End: 2025-02-12
Payer: MEDICARE

## 2025-02-12 DIAGNOSIS — R10.12 LEFT UPPER QUADRANT PAIN: ICD-10-CM

## 2025-02-12 PROCEDURE — 99213 OFFICE O/P EST LOW 20 MIN: CPT

## 2025-02-24 ENCOUNTER — APPOINTMENT (OUTPATIENT)
Dept: BREAST CENTER | Facility: CLINIC | Age: 85
End: 2025-02-24
Payer: MEDICARE

## 2025-02-24 VITALS
WEIGHT: 106 LBS | HEART RATE: 85 BPM | HEIGHT: 61 IN | BODY MASS INDEX: 20.01 KG/M2 | DIASTOLIC BLOOD PRESSURE: 91 MMHG | SYSTOLIC BLOOD PRESSURE: 145 MMHG

## 2025-02-24 DIAGNOSIS — R92.2 INCONCLUSIVE MAMMOGRAM: ICD-10-CM

## 2025-02-24 DIAGNOSIS — Z12.31 ENCOUNTER FOR SCREENING MAMMOGRAM FOR MALIGNANT NEOPLASM OF BREAST: ICD-10-CM

## 2025-02-24 DIAGNOSIS — C50.411 MALIGNANT NEOPLASM OF UPPER-OUTER QUADRANT OF RIGHT FEMALE BREAST: ICD-10-CM

## 2025-02-24 DIAGNOSIS — R92.30 DENSE BREASTS, UNSPECIFIED: ICD-10-CM

## 2025-02-24 DIAGNOSIS — Z17.0 MALIGNANT NEOPLASM OF UPPER-OUTER QUADRANT OF RIGHT FEMALE BREAST: ICD-10-CM

## 2025-02-24 PROCEDURE — 99214 OFFICE O/P EST MOD 30 MIN: CPT

## 2025-03-11 ENCOUNTER — RESULT REVIEW (OUTPATIENT)
Age: 85
End: 2025-03-11

## 2025-07-09 ENCOUNTER — APPOINTMENT (OUTPATIENT)
Dept: ORTHOPEDIC SURGERY | Facility: CLINIC | Age: 85
End: 2025-07-09
Payer: MEDICARE

## 2025-07-09 VITALS
RESPIRATION RATE: 15 BRPM | WEIGHT: 106 LBS | HEART RATE: 70 BPM | BODY MASS INDEX: 20.03 KG/M2 | OXYGEN SATURATION: 95 % | DIASTOLIC BLOOD PRESSURE: 90 MMHG | SYSTOLIC BLOOD PRESSURE: 157 MMHG

## 2025-07-09 PROBLEM — S80.11XA CONTUSION OF LEG, RIGHT, INITIAL ENCOUNTER: Status: ACTIVE | Noted: 2025-07-09

## 2025-07-09 PROCEDURE — 99203 OFFICE O/P NEW LOW 30 MIN: CPT

## 2025-07-28 ENCOUNTER — APPOINTMENT (OUTPATIENT)
Dept: BREAST CENTER | Facility: CLINIC | Age: 85
End: 2025-07-28
Payer: MEDICARE

## 2025-07-28 VITALS
HEART RATE: 87 BPM | HEIGHT: 61 IN | DIASTOLIC BLOOD PRESSURE: 92 MMHG | WEIGHT: 106 LBS | BODY MASS INDEX: 20.01 KG/M2 | SYSTOLIC BLOOD PRESSURE: 138 MMHG

## 2025-07-28 DIAGNOSIS — Z80.3 FAMILY HISTORY OF MALIGNANT NEOPLASM OF BREAST: ICD-10-CM

## 2025-07-28 DIAGNOSIS — C50.411 MALIGNANT NEOPLASM OF UPPER-OUTER QUADRANT OF RIGHT FEMALE BREAST: ICD-10-CM

## 2025-07-28 DIAGNOSIS — R92.2 INCONCLUSIVE MAMMOGRAM: ICD-10-CM

## 2025-07-28 DIAGNOSIS — R92.30 DENSE BREASTS, UNSPECIFIED: ICD-10-CM

## 2025-07-28 DIAGNOSIS — Z12.31 ENCOUNTER FOR SCREENING MAMMOGRAM FOR MALIGNANT NEOPLASM OF BREAST: ICD-10-CM

## 2025-07-28 DIAGNOSIS — Z17.0 MALIGNANT NEOPLASM OF UPPER-OUTER QUADRANT OF RIGHT FEMALE BREAST: ICD-10-CM

## 2025-07-28 DIAGNOSIS — R10.12 LEFT UPPER QUADRANT PAIN: ICD-10-CM

## 2025-07-28 PROCEDURE — 99214 OFFICE O/P EST MOD 30 MIN: CPT

## 2025-07-28 RX ORDER — HYDROCORTISONE 25 MG/G
2.5 CREAM TOPICAL
Refills: 0 | Status: ACTIVE | COMMUNITY

## 2025-07-28 RX ORDER — DULOXETINE 20 MG/1
20 CAPSULE, DELAYED RELEASE ORAL
Refills: 0 | Status: ACTIVE | COMMUNITY

## 2025-07-28 RX ORDER — POLYETHYLENE GLYCOL 3350 17 G/17G
POWDER, FOR SOLUTION ORAL
Refills: 0 | Status: ACTIVE | COMMUNITY

## 2025-07-28 RX ORDER — FLUOCINOLONE ACETONIDE 0.11 MG/ML
0.01 OIL AURICULAR (OTIC)
Refills: 0 | Status: ACTIVE | COMMUNITY

## 2025-07-28 RX ORDER — PANTOPRAZOLE 40 MG/1
40 TABLET, DELAYED RELEASE ORAL
Refills: 0 | Status: ACTIVE | COMMUNITY

## 2025-07-28 RX ORDER — AMLODIPINE BESYLATE 2.5 MG/1
2.5 TABLET ORAL
Refills: 0 | Status: ACTIVE | COMMUNITY

## 2025-07-28 RX ORDER — FEBUXOSTAT 80 MG/1
TABLET ORAL
Refills: 0 | Status: ACTIVE | COMMUNITY